# Patient Record
Sex: FEMALE | Race: WHITE | Employment: OTHER | URBAN - METROPOLITAN AREA
[De-identification: names, ages, dates, MRNs, and addresses within clinical notes are randomized per-mention and may not be internally consistent; named-entity substitution may affect disease eponyms.]

---

## 2018-06-27 ENCOUNTER — HOSPITAL ENCOUNTER (OUTPATIENT)
Facility: CLINIC | Age: 83
Setting detail: OBSERVATION
Discharge: NURSING, REHAB OR LONG-TERM CARE | End: 2018-06-29
Attending: EMERGENCY MEDICINE | Admitting: INTERNAL MEDICINE
Payer: MEDICARE

## 2018-06-27 ENCOUNTER — APPOINTMENT (OUTPATIENT)
Dept: GENERAL RADIOLOGY | Facility: CLINIC | Age: 83
End: 2018-06-27
Attending: EMERGENCY MEDICINE
Payer: MEDICARE

## 2018-06-27 DIAGNOSIS — S82.832A CLOSED FRACTURE OF PROXIMAL END OF LEFT FIBULA, UNSPECIFIED FRACTURE MORPHOLOGY, INITIAL ENCOUNTER: ICD-10-CM

## 2018-06-27 DIAGNOSIS — K59.00 CONSTIPATION, UNSPECIFIED CONSTIPATION TYPE: Primary | ICD-10-CM

## 2018-06-27 PROBLEM — S82.402A LEFT FIBULAR FRACTURE: Status: ACTIVE | Noted: 2018-06-27

## 2018-06-27 LAB
ALBUMIN UR-MCNC: 100 MG/DL
ANION GAP SERPL CALCULATED.3IONS-SCNC: 6 MMOL/L (ref 3–14)
APPEARANCE UR: ABNORMAL
BACTERIA #/AREA URNS HPF: ABNORMAL /HPF
BILIRUB UR QL STRIP: NEGATIVE
BUN SERPL-MCNC: 23 MG/DL (ref 7–30)
CALCIUM SERPL-MCNC: 8.4 MG/DL (ref 8.5–10.1)
CHLORIDE SERPL-SCNC: 106 MMOL/L (ref 94–109)
CO2 SERPL-SCNC: 25 MMOL/L (ref 20–32)
COLOR UR AUTO: ABNORMAL
CREAT SERPL-MCNC: 0.93 MG/DL (ref 0.52–1.04)
ERYTHROCYTE [DISTWIDTH] IN BLOOD BY AUTOMATED COUNT: 13.7 % (ref 10–15)
GFR SERPL CREATININE-BSD FRML MDRD: 55 ML/MIN/1.7M2
GLUCOSE SERPL-MCNC: 133 MG/DL (ref 70–99)
GLUCOSE UR STRIP-MCNC: NEGATIVE MG/DL
HCT VFR BLD AUTO: 43.3 % (ref 35–47)
HGB BLD-MCNC: 13.7 G/DL (ref 11.7–15.7)
HGB UR QL STRIP: ABNORMAL
KETONES UR STRIP-MCNC: NEGATIVE MG/DL
LEUKOCYTE ESTERASE UR QL STRIP: ABNORMAL
MCH RBC QN AUTO: 31.4 PG (ref 26.5–33)
MCHC RBC AUTO-ENTMCNC: 31.6 G/DL (ref 31.5–36.5)
MCV RBC AUTO: 99 FL (ref 78–100)
MUCOUS THREADS #/AREA URNS LPF: PRESENT /LPF
NITRATE UR QL: NEGATIVE
PH UR STRIP: 5 PH (ref 5–7)
PLATELET # BLD AUTO: 171 10E9/L (ref 150–450)
POTASSIUM SERPL-SCNC: 4.3 MMOL/L (ref 3.4–5.3)
RBC # BLD AUTO: 4.36 10E12/L (ref 3.8–5.2)
RBC #/AREA URNS AUTO: >182 /HPF (ref 0–2)
SODIUM SERPL-SCNC: 137 MMOL/L (ref 133–144)
SOURCE: ABNORMAL
SP GR UR STRIP: 1.02 (ref 1–1.03)
SQUAMOUS #/AREA URNS AUTO: 1 /HPF (ref 0–1)
UROBILINOGEN UR STRIP-MCNC: 2 MG/DL (ref 0–2)
WBC # BLD AUTO: 9.6 10E9/L (ref 4–11)
WBC #/AREA URNS AUTO: 169 /HPF (ref 0–5)

## 2018-06-27 PROCEDURE — 99285 EMERGENCY DEPT VISIT HI MDM: CPT

## 2018-06-27 PROCEDURE — 81001 URINALYSIS AUTO W/SCOPE: CPT | Performed by: EMERGENCY MEDICINE

## 2018-06-27 PROCEDURE — 36415 COLL VENOUS BLD VENIPUNCTURE: CPT | Performed by: INTERNAL MEDICINE

## 2018-06-27 PROCEDURE — G0378 HOSPITAL OBSERVATION PER HR: HCPCS

## 2018-06-27 PROCEDURE — A9270 NON-COVERED ITEM OR SERVICE: HCPCS | Mod: GY | Performed by: INTERNAL MEDICINE

## 2018-06-27 PROCEDURE — 25000132 ZZH RX MED GY IP 250 OP 250 PS 637: Mod: GY | Performed by: INTERNAL MEDICINE

## 2018-06-27 PROCEDURE — 99219 ZZC INITIAL OBSERVATION CARE,LEVL II: CPT | Performed by: INTERNAL MEDICINE

## 2018-06-27 PROCEDURE — 85027 COMPLETE CBC AUTOMATED: CPT | Performed by: INTERNAL MEDICINE

## 2018-06-27 PROCEDURE — 87086 URINE CULTURE/COLONY COUNT: CPT | Performed by: INTERNAL MEDICINE

## 2018-06-27 PROCEDURE — 80048 BASIC METABOLIC PNL TOTAL CA: CPT | Performed by: INTERNAL MEDICINE

## 2018-06-27 PROCEDURE — 25000132 ZZH RX MED GY IP 250 OP 250 PS 637: Mod: GY | Performed by: EMERGENCY MEDICINE

## 2018-06-27 PROCEDURE — 73562 X-RAY EXAM OF KNEE 3: CPT | Mod: LT

## 2018-06-27 PROCEDURE — A9270 NON-COVERED ITEM OR SERVICE: HCPCS | Mod: GY | Performed by: EMERGENCY MEDICINE

## 2018-06-27 RX ORDER — NALOXONE HYDROCHLORIDE 0.4 MG/ML
.1-.4 INJECTION, SOLUTION INTRAMUSCULAR; INTRAVENOUS; SUBCUTANEOUS
Status: DISCONTINUED | OUTPATIENT
Start: 2018-06-27 | End: 2018-06-29 | Stop reason: HOSPADM

## 2018-06-27 RX ORDER — GINSENG 100 MG
CAPSULE ORAL
Status: DISCONTINUED
Start: 2018-06-27 | End: 2018-06-27 | Stop reason: HOSPADM

## 2018-06-27 RX ORDER — AMOXICILLIN 250 MG
1 CAPSULE ORAL 2 TIMES DAILY PRN
Status: DISCONTINUED | OUTPATIENT
Start: 2018-06-27 | End: 2018-06-29 | Stop reason: HOSPADM

## 2018-06-27 RX ORDER — ASPIRIN 81 MG/1
81 TABLET, CHEWABLE ORAL DAILY
Status: DISCONTINUED | OUTPATIENT
Start: 2018-06-28 | End: 2018-06-29 | Stop reason: HOSPADM

## 2018-06-27 RX ORDER — GABAPENTIN 300 MG/1
300 CAPSULE ORAL AT BEDTIME
Status: DISCONTINUED | OUTPATIENT
Start: 2018-06-27 | End: 2018-06-29 | Stop reason: HOSPADM

## 2018-06-27 RX ORDER — ACETAMINOPHEN 325 MG/1
650 TABLET ORAL EVERY 4 HOURS PRN
Status: DISCONTINUED | OUTPATIENT
Start: 2018-06-27 | End: 2018-06-29 | Stop reason: HOSPADM

## 2018-06-27 RX ORDER — POLYETHYLENE GLYCOL 3350 17 G/17G
17 POWDER, FOR SOLUTION ORAL DAILY PRN
Status: DISCONTINUED | OUTPATIENT
Start: 2018-06-27 | End: 2018-06-29 | Stop reason: HOSPADM

## 2018-06-27 RX ORDER — ONDANSETRON 2 MG/ML
4 INJECTION INTRAMUSCULAR; INTRAVENOUS EVERY 6 HOURS PRN
Status: DISCONTINUED | OUTPATIENT
Start: 2018-06-27 | End: 2018-06-29 | Stop reason: HOSPADM

## 2018-06-27 RX ORDER — BISACODYL 10 MG
10 SUPPOSITORY, RECTAL RECTAL DAILY PRN
Status: DISCONTINUED | OUTPATIENT
Start: 2018-06-27 | End: 2018-06-29 | Stop reason: HOSPADM

## 2018-06-27 RX ORDER — PROCHLORPERAZINE MALEATE 5 MG
5 TABLET ORAL EVERY 6 HOURS PRN
Status: DISCONTINUED | OUTPATIENT
Start: 2018-06-27 | End: 2018-06-29 | Stop reason: HOSPADM

## 2018-06-27 RX ORDER — HYDROCODONE BITARTRATE AND ACETAMINOPHEN 5; 325 MG/1; MG/1
1 TABLET ORAL ONCE
Status: COMPLETED | OUTPATIENT
Start: 2018-06-27 | End: 2018-06-27

## 2018-06-27 RX ORDER — SIMVASTATIN 10 MG
10 TABLET ORAL EVERY EVENING
Status: DISCONTINUED | OUTPATIENT
Start: 2018-06-27 | End: 2018-06-29 | Stop reason: HOSPADM

## 2018-06-27 RX ORDER — AMOXICILLIN 250 MG
2 CAPSULE ORAL 2 TIMES DAILY PRN
Status: DISCONTINUED | OUTPATIENT
Start: 2018-06-27 | End: 2018-06-29 | Stop reason: HOSPADM

## 2018-06-27 RX ORDER — PROCHLORPERAZINE 25 MG
12.5 SUPPOSITORY, RECTAL RECTAL EVERY 12 HOURS PRN
Status: DISCONTINUED | OUTPATIENT
Start: 2018-06-27 | End: 2018-06-29 | Stop reason: HOSPADM

## 2018-06-27 RX ORDER — ACETAMINOPHEN 650 MG/1
650 SUPPOSITORY RECTAL EVERY 4 HOURS PRN
Status: DISCONTINUED | OUTPATIENT
Start: 2018-06-27 | End: 2018-06-29 | Stop reason: HOSPADM

## 2018-06-27 RX ORDER — ONDANSETRON 4 MG/1
4 TABLET, ORALLY DISINTEGRATING ORAL EVERY 6 HOURS PRN
Status: DISCONTINUED | OUTPATIENT
Start: 2018-06-27 | End: 2018-06-29 | Stop reason: HOSPADM

## 2018-06-27 RX ADMIN — SIMVASTATIN 10 MG: 10 TABLET, FILM COATED ORAL at 21:43

## 2018-06-27 RX ADMIN — GABAPENTIN 300 MG: 300 CAPSULE ORAL at 21:43

## 2018-06-27 RX ADMIN — TRAMADOL HYDROCHLORIDE 25 MG: 50 TABLET ORAL at 23:49

## 2018-06-27 RX ADMIN — HYDROCODONE BITARTRATE AND ACETAMINOPHEN 1 TABLET: 5; 325 TABLET ORAL at 15:13

## 2018-06-27 ASSESSMENT — ENCOUNTER SYMPTOMS
COLOR CHANGE: 0
WOUND: 0
WEAKNESS: 0
FEVER: 0
ARTHRALGIAS: 1
MYALGIAS: 0
BACK PAIN: 0
NUMBNESS: 0

## 2018-06-27 NOTE — ED NOTES
[facility]  ED Nurse Handoff Report    Lali Santos is a 89 year old female   ED Chief complaint: Knee Injury (left)  . ED Diagnosis:   Final diagnoses:   Closed fracture of proximal end of left fibula, unspecified fracture morphology, initial encounter     Allergies: No Known Allergies    Code Status: Full Code  Activity level - Baseline/Home:  Independent. Activity Level - Current:   Stand with Assist of 2. Lift room needed: No. Bariatric: No   Needed: No   Isolation: No. Infection: Not Applicable.     Vital Signs:   Vitals:    06/27/18 1508 06/27/18 1530   BP: 124/80 121/79   Pulse: 89    Resp: 15    Temp: 97.7  F (36.5  C)    TempSrc: Temporal    SpO2: 92% 91%       Cardiac Rhythm:  ,      Pain level:    Patient confused: No. Patient Falls Risk: Yes.   Elimination Status: Unable to void, no urge since ED admission  Patient Report - Initial Complaint: pt comes in from [address] building. after slow assist/slide down to floor when left knee gave out when pt was getting out of shower. pt did obtain a skin teat to left lower leg. EMS called pt had no complaints of pain at that time and skin tear was dressed. pt did not hit head, no LOC. EMS on scene at  11:45. staff states pt has chronic knee pain. pt was able to pivot on good leg with assist of EMS. pt normally uses a electric wheelchair.. Focused Assessment: A&Ox4, L knee pain. CMS intact.   Tests Performed: xray. Abnormal Results:   XR Knee Left 3 Views   Final Result   IMPRESSION: Osteopenia about the left knee. There is a minimally   displaced proximal fibular fracture. No other fractures are suspected   in the left knee. Severe osteoarthritis with complete loss of joint   space in the lateral compartment as well as a small knee effusion and   chondrocalcinosis in the medial compartment.      YAMILET KEARNS MD        Treatments provided: Oral pain medication, knee imobilizer. Pt has no IV.  Family Comments: Daughter Ike Santos  updated ([phone]  OBS brochure/video discussed/provided to patient:  Yes  ED Medications:   Medications   HYDROcodone-acetaminophen (NORCO) 5-325 MG per tablet 1 tablet (1 tablet Oral Given 6/27/18 8977)     Drips infusing:  No  For the majority of the shift, the patient's behavior Green. Interventions performed were N/A.     Severe Sepsis OR Septic Shock Diagnosis Present: No      ED Nurse Name/Phone Number: Christofer Umana,   4:54 PM  RECEIVING UNIT ED HANDOFF REVIEW    Above ED Nurse Handoff Report was reviewed: Yes  Reviewed by: Sherlyn Taveras on June 27, 2018 at 5:17 PM

## 2018-06-27 NOTE — IP AVS SNAPSHOT
MRN:[id number]                      After Visit Summary   6/27/2018    Lali Santos    MRN: [id number]           Thank you!     Thank you for choosing [facility] for your care. Our goal is always to provide you with excellent care. Hearing back from our patients is one way we can continue to improve our services. Please take a few minutes to complete the written survey that you may receive in the mail after you visit. If you would like to speak to someone directly about your visit please contact Patient Relations at [phone]. Thank you!          Patient Information     Date Of Birth          10/26/1916        About your hospital stay     You were admitted on:  June 27, 2018 You last received care in the:  [facility] Observation Department    You were discharged on:  June 29, 2018        Reason for your hospital stay       Fall and sustained a left proximal fibular fracture. PT evaluated and recommending TCU. Urinalysis concerning for infection but urine culture is negative.                  Who to Call     For medical emergencies, please call 911.  For non-urgent questions about your medical care, please call your primary care provider or clinic, [phone]          Attending Provider     Provider Specialty    Ankur Bo MD Emergency Medicine    Ron Gardner DO Internal Medicine       Primary Care Provider Office Phone # Fax #    Shiva Garcia [phone]      After Care Instructions     Activity - Up with assistive device           Advance Diet as Tolerated       Follow this diet upon discharge: Regular            Encourage PO fluids           Fall precautions           General info for SNF       Length of Stay Estimate: Short Term Care: Estimated # of Days <30  Condition at Discharge: Stable  Level of care:skilled   Rehabilitation Potential: Good  Admission H&P remains valid and up-to-date: Yes  Recent Chemotherapy:  "N/A  Use Nursing Home Standing Orders: Yes            Mantoux instructions       Give two-step Mantoux (PPD) Per Facility Policy Yes            Weight bearing status       Weight bear as tolerated on left leg, ideally with boot in place. Ok to weight bear as tolerated without boot if patient is not tolerating the boot or if the boot is impairing mobility. If mobility is impaired without the boot, continue use of boot with weightbearing.                  Follow-up Appointments     Follow Up and recommended labs and tests       Follow up with Dr. Andersen of [facility] in 3 weeks. Please call Dr. Andersen's care coordinator, Daphne, at [phone] to schedule this appointment.                  Additional Services     Occupational Therapy Adult Consult       Evaluate and treat as clinically indicated.    Reason:  Left proximal fibular fx            Physical Therapy Adult Consult       Evaluate and treat as clinically indicated.    Reason:  Left proximal fibular fx                             Pending Results     No orders found from 6/25/2018 to 6/28/2018.            Statement of Approval     Ordered          06/29/18 2634  I have reviewed and agree with all the recommendations and orders detailed in this document.  EFFECTIVE NOW     Approved and electronically signed by:  Lali Rousseau PA-C             Admission Information     Date & Time Provider Department Dept. Phone    6/27/2018 Ron Gardner, DO [facility] Observation Department [phone]      Your Vitals Were     Blood Pressure Pulse Temperature Respirations Height Weight    105/87 (BP Location: Right arm) 85 97.7  F (36.5  C) (Oral) 16 1.6 m (5' 3\") 64.1 kg (141 lb 4.8 oz)    Pulse Oximetry BMI (Body Mass Index)                93% 25.03 kg/m2          MyChart Information     [email or web address] lets you send messages to your doctor, view your test results, renew your prescriptions, schedule appointments and more. To sign up, go to " "[email or web address] . Click on \"Log in\" on the left side of the screen, which will take you to the Welcome page. Then click on \"Sign up Now\" on the right side of the page.     You will be asked to enter the access code listed below, as well as some personal information. Please follow the directions to create your username and password.     Your access code is: [email or web address]  Expires: 2018  1:20 PM     Your access code will  in 90 days. If you need help or a new code, please call your [facility] clinic or [phone].        Care EveryWhere ID     This is your Care EveryWhere ID. This could be used by other organizations to access your [facility] medical records  [id number]        Equal Access to Services     DAYNA OSCAR : Alejandra Chapman, rita king, tej heredia, claudio huizar . So [facility] [phone].    ATENCIÓN: Si habla español, tiene a ochoa disposición servicios gratuitos de asistencia lingüística. Ana al [phone].    We comply with applicable federal civil rights laws and [place] laws. We do not discriminate on the basis of race, color, national origin, age, disability, sex, sexual orientation, or gender identity.               Review of your medicines      START taking        Dose / Directions    acetaminophen 500 MG tablet   Commonly known as:  TYLENOL   Used for:  Closed fracture of proximal end of left fibula, unspecified fracture morphology, initial encounter        Dose:  1000 mg   Take 2 tablets (1,000 mg) by mouth 3 times daily   Refills:  0       polyethylene glycol Packet   Commonly known as:  MIRALAX/GLYCOLAX   Used for:  Constipation, unspecified constipation type        Dose:  17 g   Take 17 g by mouth daily as needed for constipation   Quantity:  7 packet   Refills:  0       traMADol 50 MG tablet   Commonly known as:  ULTRAM   Used for:  Closed fracture of proximal end of left fibula, unspecified fracture morphology, " initial encounter        Dose:  25 mg   Take 0.5 tablets (25 mg) by mouth every 6 hours as needed for moderate pain   Quantity:  10 tablet   Refills:  0         CONTINUE these medicines which have NOT CHANGED        Dose / Directions    aspirin 81 MG chewable tablet        Dose:  81 mg   Take 81 mg by mouth daily   Refills:  0       cholecalciferol 2000 units Caps        Dose:  1 capsule   Take 1 capsule by mouth daily   Refills:  0       metoprolol tartrate 25 MG tablet   Commonly known as:  LOPRESSOR        Dose:  12.5 mg   Take 12.5 mg by mouth every morning   Refills:  0       multivitamin Tabs tablet        Dose:  1 tablet   Take 1 tablet by mouth daily   Refills:  0       simvastatin 10 MG tablet   Commonly known as:  ZOCOR        Dose:  10 mg   Take 10 mg by mouth every evening   Refills:  0            Where to get your medicines      Some of these will need a paper prescription and others can be bought over the counter. Ask your nurse if you have questions.     Bring a paper prescription for each of these medications     traMADol 50 MG tablet       You don't need a prescription for these medications     acetaminophen 500 MG tablet    polyethylene glycol Packet                Protect others around you: Learn how to safely use, store and throw away your medicines at www.disposemymeds.org.        Information about OPIOIDS     PRESCRIPTION OPIOIDS: WHAT YOU NEED TO KNOW   We gave you an opioid (narcotic) pain medicine. It is important to manage your pain, but opioids are not always the best choice. You should first try all the other options your care team gave you. Take this medicine for as short a time (and as few doses) as possible.     These medicines have risks:    DO NOT drive when on new or higher doses of pain medicine. These medicines can affect your alertness and reaction times, and you could be arrested for driving under the influence (DUI). If you need to use opioids long-term, talk to your care team  about driving.    DO NOT operate heave machinery    DO NOT do any other dangerous activities while taking these medicines.     DO NOT drink any alcohol while taking these medicines.      If the opioid prescribed includes acetaminophen, DO NOT take with any other medicines that contain acetaminophen. Read all labels carefully. Look for the word  acetaminophen  or  Tylenol.  Ask your pharmacist if you have questions or are unsure.    You can get addicted to pain medicines, especially if you have a history of addiction (chemical, alcohol or substance dependence). Talk to your care team about ways to reduce this risk.    Store your pills in a secure place, locked if possible. We will not replace any lost or stolen medicine. If you don t finish your medicine, please throw away (dispose) as directed by your pharmacist. The [place] Pollution Control Agency has more information about safe disposal: https://www.pca.[facility].[place].us/living-green/managing-unwanted-medications.     All opioids tend to cause constipation. Drink plenty of water and eat foods that have a lot of fiber, such as fruits, vegetables, prune juice, apple juice and high-fiber cereal. Take a laxative (Miralax, milk of magnesia, Colace, Senna) if you don t move your bowels at least every other day.              Medication List: This is a list of all your medications and when to take them. Check marks below indicate your daily home schedule. Keep this list as a reference.      Medications           Morning Afternoon Evening Bedtime As Needed    acetaminophen 500 MG tablet   Commonly known as:  TYLENOL   Take 2 tablets (1,000 mg) by mouth 3 times daily   Last time this was given:  975 mg on 6/29/2018 12:15 PM                                aspirin 81 MG chewable tablet   Take 81 mg by mouth daily   Last time this was given:  81 mg on 6/29/2018  9:46 AM                                cholecalciferol 2000 units Caps   Take 1 capsule by mouth daily                                 metoprolol tartrate 25 MG tablet   Commonly known as:  LOPRESSOR   Take 12.5 mg by mouth every morning   Last time this was given:  12.5 mg on 6/29/2018  9:49 AM                                multivitamin Tabs tablet   Take 1 tablet by mouth daily                                polyethylene glycol Packet   Commonly known as:  MIRALAX/GLYCOLAX   Take 17 g by mouth daily as needed for constipation                                simvastatin 10 MG tablet   Commonly known as:  ZOCOR   Take 10 mg by mouth every evening   Last time this was given:  10 mg on 6/28/2018  8:07 PM                                traMADol 50 MG tablet   Commonly known as:  ULTRAM   Take 0.5 tablets (25 mg) by mouth every 6 hours as needed for moderate pain   Last time this was given:  25 mg on 6/29/2018  4:46 PM

## 2018-06-27 NOTE — ED TRIAGE NOTES
pt comes in from [address] building. after slow assist/slide down to floor when left knee gave out when pt was getting out of shower. pt did obtain a skin teat to left lower leg. EMS called pt had no complaints of pain at that time and skin tear was dressed. pt did not hit head, no LOC. EMS on scene at  11:45. staff states pt has chronic knee pain. pt was able to pivot on good leg with assist of EMS. pt normally uses a electric wheelchair.

## 2018-06-27 NOTE — PLAN OF CARE
Problem: Patient Care Overview  Goal: Plan of Care/Patient Progress Review  Outcome: No Change  Acute Traumatic Pain     1.  Pain controlled with oral analgesia: Yes      2.  Vital signs stable: Yes      3.  Diagnotic testing complete: Yes, xray showed fib fracture       4.  Cleared from consultants (if applicable): N/A      5. Return to near baseline physical activity: no

## 2018-06-27 NOTE — PHARMACY-ADMISSION MEDICATION HISTORY
Admission medication history interview status for this patient is complete. See [facility] admission navigator for allergy information, prior to admission medications and immunization status.     Medication history interview source(s):Patient and Family  Medication history resources (including written lists, pill bottles, clinic record):None  Primary pharmacy:[facility] in [place]    Changes made to PTA medication list:  Added: -  Deleted: amlodipine ( per daughter)  Changed: metoprolol tart to 12.5 mg qd    Actions taken by pharmacist (provider contacted, etc):Called daughter Sue to clarify meds     Additional medication history information:None    Medication reconciliation/reorder completed by provider prior to medication history? No    Do you take OTC medications (eg tylenol, ibuprofen, fish oil, eye/ear drops, etc)? yes(Y/N)    For patients on insulin therapy: yes (Y/N)  Lantus/levemir/NPH/Mix 70/30 dose:   (Y/N) (see Med list for doses)   Sliding scale Novolog Y/N  If Yes, do you have a baseline novolog pre-meal dose:  units with meals  Patients eat three meals a day:   Y/N    How many episodes of hypoglycemia do you have per week: _______  How many missed doses do you have per week: ______  How many times do you check your blood glucose per day: _______   Any Barriers to therapy - Be specific :  cost of medications, comfortable with giving injections (if applicable), comfortable and confident with current diabetes regimen: Y/N ______________      Prior to Admission medications    Medication Sig Last Dose Taking? Auth Provider   aspirin 81 MG chewable tablet Take 81 mg by mouth daily 6/27/2018 at Unknown time Yes Unknown, Entered By History   cholecalciferol 2000 UNITS CAPS Take 1 capsule by mouth daily 6/27/2018 at Unknown time Yes Unknown, Entered By History   metoprolol (LOPRESSOR) 25 MG tablet Take 12.5 mg by mouth every morning  6/27/2018 at Unknown time Yes Unknown, Entered By History   multivitamin (OCUVITE)  TABS Take 1 tablet by mouth daily 6/27/2018 at Unknown time Yes Unknown, Entered By History   simvastatin (ZOCOR) 10 MG tablet Take 10 mg by mouth every evening 6/26/2018 at Unknown time Yes Unknown, Entered By History

## 2018-06-27 NOTE — IP AVS SNAPSHOT
[facility] Observation Department    [address]    [facility] [address]    Phone:  [phone]                                       After Visit Summary   6/27/2018    Lali Santos    MRN: [id number]           After Visit Summary Signature Page     I have received my discharge instructions, and my questions have been answered. I have discussed any challenges I see with this plan with the nurse or doctor.    ..........................................................................................................................................  Patient/Patient Representative Signature      ..........................................................................................................................................  Patient Representative Print Name and Relationship to Patient    ..................................................               ................................................  Date                                            Time    ..........................................................................................................................................  Reviewed by Signature/Title    ...................................................              ..............................................  Date                                                            Time

## 2018-06-27 NOTE — ED PROVIDER NOTES
History     Chief Complaint:  Leg Pain    HPI   Lali Santos is a 89 year old female who presents with leg pain. The patient suffered a minor fall while in the shower with a helper at approximately 1145 this morning; the patient was slowly lowered/slid down to the floor and suffered a skin tear to the left leg. EMS was called soon thereafter for this and placed a dressing at the area but felt that patient did not require transport at that time. Some time later, the staff at her assisted living facility called EMS once more as patient began to have complaints of increased left leg pain. On arrival patient has no complaints other than left knee and leg pain. No other symptoms, injuries, or concerns reported. Patient was not given any medications prior to arrival.    Allergies:  No known drug allergies.    Medications:    Norvasc  Aspirin  Cholecalciferol  Metoprolol  Multivitamin  Zocor     Past Medical History:    Coronary atherosclerosis of unspecified type of vessel, native or graft  Other and unspecified hyperlipidemia  Osteoarthrosis, unspecified whether generalized or localized, pelvic region and thigh  Unspecified essential hypertension  Unspecified disorder of thyroid  Lumbago  DJD (degenerative joint disease) of knee  Acute Appendicitis    Past Surgical History:    Coronary artery bypass graft  CATARACT REMOVAL  CVL CORONARY ANGIOGRAM  CORONARY ARTERY BYPASS GRAFT  INGROWN TOENAIL REMOVAL  LAP CHOLECYSTECTOMY  APPENDECTOMY  HYSTERECTOMY    Family History:    Stroke- Brother  Heart Disease-Brother  Heart Disease-Father  Stroke-Mother  Cancer-Sister  Stroke-Sister  Cancer-Sister  Arthritis-Son    Social History:  Smoking status: Never smoker  Alcohol use: No  Marital Status:        Review of Systems   Constitutional: Negative for fever.   Musculoskeletal: Positive for arthralgias and gait problem. Negative for back pain and myalgias.   Skin: Negative for color change and wound.   Neurological:  Negative for weakness and numbness.   All other systems reviewed and are negative.    Physical Exam     Patient Vitals for the past 24 hrs:   BP Temp Temp src Pulse Heart Rate Resp SpO2   06/27/18 1508 124/80 97.7  F (36.5  C) Temporal 89 89 15 92 %         Physical Exam  General: Patient is alert and cooperative.  HENT:  No trauma.  Eyes: EOMI. Normal conjunctiva.  Neck:  Normal range of motion and appearance. No tenderness.   Cardiovascular:  Normal rate, regular rhythm and normal heart sounds.   Pulmonary/Chest:  Effort normal. No wheezing or crackles.  Abdominal: Soft. No distension or tenderness.     Musculoskeletal: LLE notable for tenderness and swelling of knee with limited mobility due to pain.  Superficial skin tear anterior shin, no active bleeding.    Neurological: oriented, no focal weakness. Chronic drop foot left side.   Skin:  No lacerations.  Psychiatric: Normal mood and affect. Normal behavior and judgement.      Emergency Department Course   Imaging:  XR Knee, Left, 3 views:  IMPRESSION: Osteopenia about the left knee. There is a minimally displaced proximal fibular fracture. No other fractures are suspected in the left knee. Severe osteoarthritis with complete loss of joint  space in the lateral compartment as well as a small knee effusion and chondrocalcinosis in the medial compartment.  Report per radiology.     Interventions:  (1513) Hydrocodone-Acetaminophen 5-325 mg, PO    Emergency Department Course:  Nursing notes and vitals reviewed.  (8479) I performed an exam of the patient as documented above.    The patient was sent for a x-ray while in the emergency department, findings above. \    Findings and plan explained to the patient who consents to admission.   (9507) I discussed the patient with Dr. Gardner of the hospitalist service, who will admit the patient to a OBS bed for further monitoring, evaluation, and treatment.    Impression & Plan    Medical Decision Making:  He is  89-year-old female arrives by ambulance for evaluation of the left knee and lower leg injury sustained secondary to fall in shower.  She was accompanied by her PCA at the time.  She reports that her knee gave out and she fell in the process of extricating her from the shower it sounds like her knee was bent causing worsening pain.  She did not strike her head or lose consciousness.  There was no syncopal incident.  Physical examination reveals marked swelling and tenderness of the left knee as well as superficial skin tears to the lower x-ray discloses a nondisplaced proximal fibula fracture.  She was placed in a knee immobilizer required admission for supportive cares and eventual placement into a transitional care or nursing home type facility.  She lives in a senior apartment presently.    Diagnosis:    ICD-10-CM   1. Closed fracture of proximal end of left fibula, unspecified fracture morphology, initial encounter S82.832A       Disposition:  Patient is admitted to a OBS bed under the care of Dr. Gardner.           I, Richard Veloz, am serving as a scribe on 6/27/2018 at 2:59 PM to personally document services performed by Dr. Bo based on my observations and the provider's statements to me.         Richard Veloz  6/27/2018   [facility] EMERGENCY DEPARTMENT       Ankur Bo MD  06/28/18 0859

## 2018-06-27 NOTE — IP AVS SNAPSHOT
Lali Santos #[id number] (CSN: [id number])  (89 year old F)  (Adm: 18)     [id number]               [facility] OBSERVATION DEPARTMENT: [phone]            Patient Demographics     Patient Name Sex          Age SSN Address Phone    Tom Lali MIKE Female 10/26/1916 (89 year old) [id number]    [facility] [address] [phone] (Home) *Preferred*  [phone] (Mobile)      Emergency Contact(s)     Name Relation Home Work Mobile    Owen Santos Son [phone]      Sue Santos Daughter [phone]        Admission Information     Attending Provider Admitting Provider Admission Type Admission Date/Time    Ron Gardner, DO Ron Gardner, DO Emergency 18  1452    Discharge Date Hospital Service Auth/Cert Status Service Area     Observation Incomplete [facility]    Unit Room/Bed Admission Status        OBSERVATION DEPT  Admission (Confirmed)       Admission     Complaint    Left fibular fracture      Hospital Account     Name Acct ID Class Status Primary Coverage    Lali Santos [id number] Observation Open MEDICARE - MEDICARE            Guarantor Account (for Hospital Account #[id number])     Name Relation to Pt Service Area Active? Acct Type    Lali Santos Self FCS Yes Personal/Family    Address Phone          [address] [place] [address] [phone](H)              Coverage Information (for Hospital Account #[id number])     1. MEDICARE/MEDICARE     F/O Payor/Plan Precert #    MEDICARE/MEDICARE     Subscriber Subscriber #    Lali Santos [id number]    Address Phone    ATTN CLAIMS  [address]  [facility] [place] [address] [phone]          2. BCBS/[employer]BS [employer] EM[employer]OYEE PROGRAM     F/O Payor/Plan Precert #    BCBS/BCBS [employer] EMPLOYEE PROGRAM     Subscriber Subscriber #    Lali Santos [id number]    Address Phone    [id number]  [place] [address] [phone]                                                      INTERAGENCY TRANSFER FORM - PHYSICIAN ORDERS   6/27/2018                       [facility] OBSERVATION DEPARTMENT: [phone]            Attending Provider: Ron Gardner DO     Allergies:  No Known Allergies    Infection:  None   Service:  Observation    Ht:  1.6 m (5' 3\")   Wt:  64.1 kg (141 lb 4.8 oz)   Admission Wt:  64.1 kg (141 lb 4.8 oz)    BMI:  25.03 kg/m 2   BSA:  1.69 m 2            ED Clinical Impression     Diagnosis Description Comment Added By Time Added    Closed fracture of proximal end of left fibula, unspecified fracture morphology, initial encounter [S82.832A] Closed fracture of proximal end of left fibula, unspecified fracture morphology, initial encounter [S82.832A]  Ankur Bo MD 6/27/2018  4:12 PM      Hospital Problems as of 6/29/2018              Priority Class Noted POA    Left fibular fracture Medium  6/27/2018 Yes      Non-Hospital Problems as of 6/29/2018              Priority Class Noted    Atrial fibrillation with rapid ventricular response (H) Medium  10/11/2015      Code Status History     Date Active Date Inactive Code Status Order ID Comments User Context    6/29/2018 12:54 PM  DNR/DNI [id number]  Lali Rousseau PA-C Outpatient    6/28/2018 12:44 PM 6/29/2018 12:54 PM DNR/DNI [id number]  Imelda Hunter PA-C Inpatient    10/11/2015 11:31 PM 10/13/2015  3:24 PM DNR/DNI [id number]  Rao Cosme MD Inpatient      Current Code Status     Date Active Code Status Order ID Comments User Context       Prior      Summary of Visit     Reason for your hospital stay       Fall and sustained a left proximal fibular fracture. PT evaluated and recommending TCU. Urinalysis concerning for infection but urine culture is negative.                Medication Review      START taking        Dose / Directions Comments    acetaminophen 500 MG tablet   Commonly known as:  TYLENOL "   Used for:  Closed fracture of proximal end of left fibula, unspecified fracture morphology, initial encounter        Dose:  1000 mg   Take 2 tablets (1,000 mg) by mouth 3 times daily   Refills:  0        polyethylene glycol Packet   Commonly known as:  MIRALAX/GLYCOLAX   Used for:  Constipation, unspecified constipation type        Dose:  17 g   Take 17 g by mouth daily as needed for constipation   Quantity:  7 packet   Refills:  0        traMADol 50 MG tablet   Commonly known as:  ULTRAM   Used for:  Closed fracture of proximal end of left fibula, unspecified fracture morphology, initial encounter        Dose:  25 mg   Take 0.5 tablets (25 mg) by mouth every 6 hours as needed for moderate pain   Quantity:  10 tablet   Refills:  0          CONTINUE these medications which have NOT CHANGED        Dose / Directions Comments    aspirin 81 MG chewable tablet        Dose:  81 mg   Take 81 mg by mouth daily   Refills:  0        cholecalciferol 2000 units Caps        Dose:  1 capsule   Take 1 capsule by mouth daily   Refills:  0        metoprolol tartrate 25 MG tablet   Commonly known as:  LOPRESSOR        Dose:  12.5 mg   Take 12.5 mg by mouth every morning   Refills:  0        multivitamin Tabs tablet        Dose:  1 tablet   Take 1 tablet by mouth daily   Refills:  0        simvastatin 10 MG tablet   Commonly known as:  ZOCOR        Dose:  10 mg   Take 10 mg by mouth every evening   Refills:  0                After Care     Activity - Up with assistive device           Advance Diet as Tolerated       Follow this diet upon discharge: Regular       Encourage PO fluids           Fall precautions           General info for SNF       Length of Stay Estimate: Short Term Care: Estimated # of Days <30  Condition at Discharge: Stable  Level of care:skilled   Rehabilitation Potential: Good  Admission H&P remains valid and up-to-date: Yes  Recent Chemotherapy: N/A  Use Nursing Home Standing Orders: Yes       Mantoux instructions  "      Give two-step Mantoux (PPD) Per Facility Policy Yes       Weight bearing status       Weight bear as tolerated on left leg, ideally with boot in place. Ok to weight bear as tolerated without boot if patient is not tolerating the boot or if the boot is impairing mobility. If mobility is impaired without the boot, continue use of boot with weightbearing.             Referrals     Occupational Therapy Adult Consult       Evaluate and treat as clinically indicated.    Reason:  Left proximal fibular fx       Physical Therapy Adult Consult       Evaluate and treat as clinically indicated.    Reason:  Left proximal fibular fx             Follow-Up Appointment Instructions     Follow Up and recommended labs and tests       Follow up with Dr. Andersen of [facility] in 3 weeks. Please call Dr. Andersen's care coordinator, Daphne, at [phone] to schedule this appointment.             Statement of Approval     Ordered          06/29/18 0568  I have reviewed and agree with all the recommendations and orders detailed in this document.  EFFECTIVE NOW     Approved and electronically signed by:  Lali Rousseau PA-C                                                 INTERAGENCY TRANSFER FORM - NURSING   6/27/2018                       [facility] OBSERVATION DEPARTMENT: [phone]            Attending Provider: Ron Gardner DO     Allergies:  No Known Allergies    Infection:  None   Service:  Observation    Ht:  1.6 m (5' 3\")   Wt:  64.1 kg (141 lb 4.8 oz)   Admission Wt:  64.1 kg (141 lb 4.8 oz)    BMI:  25.03 kg/m 2   BSA:  1.69 m 2            Advance Directives        Scanned docmt in ACP Activity?           No scanned doc        Immunizations     None      ASSESSMENT     Discharge Profile Flowsheet     EXPECTED DISCHARGE     Patient's communication style  spoken language (English or Bilingual) 06/27/18 5196    Expected Discharge Date  -- ([facility]?) 06/28/18 1402   FINAL RESOURCES   "    DISCHARGE NEEDS ASSESSMENT     Resources List  Skilled Nursing Facility 06/28/18 1654    Patient/family verbalizes understanding of discharge plan recommendations?  Yes (daughter antic. TCU, SW will discuss with pt after PT assess) 06/28/18 1654   Skilled Nursing Facility  [facility] [phone], Fax: [phone] 06/29/18 1250    Readmission Within The Last 30 Days  no previous admission in last 30 days 06/28/18 1654   SKIN      Equipment Currently Used at Home  wheelchair, power 06/29/18 1104   Inspection of bony prominences  Full 06/29/18 1202    Transportation Available  [facility] @ 1745) 06/29/18 1250   Focused inspection of bony prominences  Knee, left 06/29/18 0042    Current Discharge Risk  -- (to be determined) 06/28/18 1654   Full except areas not inspected   Coccyx;Sacrum;Buttock, right;Buttock, left 06/28/18 1428    # of Referrals Placed by CTS  Transportation;Post Acute Facilities 06/29/18 1250   Inspection under devices  Full 06/28/18 1605    New Steerage to  Clinics?  No 06/28/18 1652   Focused inspection under devices  Knee immobilizer 06/27/18 2359    Primary Care MD Name  Jose 06/28/18 1652   Skin WDL  ex 06/29/18 1202    ASSESSMENT OF FUNCTIONAL STATUS     Skin Temperature  warm 06/29/18 1202    Prior to admission patient needed assistance with:  Transportation;Shopping;Handling finances;Laundry/Housekeeping 06/28/18 1652   Skin Moisture  dry 06/29/18 1202    Assesssment of Functional Status  Not at baseline with ADL Functioning;Not at baseline with mobility (placement consideration) 06/28/18 1652   Skin Elasticity  slow return to original state 06/29/18 1202    GASTROINTESTINAL (ADULT,PEDIATRIC,OB)     Skin Integrity  bruise(s);abrasion(s);skin tear(s);scab(s) 06/29/18 1202    GI WDL  WDL 06/29/18 1202   SAFETY      Last Bowel Movement  -- (PTA, does not recall when ) 06/29/18 1202   Safety WDL  WDL 06/29/18 1202    Passing flatus  " yes 06/29/18 1202   All Alarms  alarm(s) activated and audible 06/29/18 1202    COMMUNICATION ASSESSMENT                        Assessment WDL (Within Defined Limits) Definitions           Safety WDL     Effective: 09/28/15    Row Information: <b>WDL Definition:</b> Bed in low position, wheels locked; call light in reach; upper side rails up x 2; ID band on<br> <font color=\"gray\"><i>Item=AS safety wdl>>List=AS safety wdl>>Version=F14</i></font>      Skin WDL     Effective: 09/28/15    Row Information: <b>WDL Definition:</b> Warm; dry; intact; elastic; without discoloration; pressure points without redness<br> <font color=\"gray\"><i>Item=AS skin wdl>>List=AS skin wdl>>Version=F14</i></font>      Vitals     Vital Signs Flowsheet     VITAL SIGNS     Pain Orientation  Left 06/28/18 1256    Temp  98.1  F (36.7  C) 06/29/18 1223   Pain Descriptors  Shooting 06/28/18 1256    Temp src  Oral 06/29/18 1223   Pain Intervention(s)  Medication (See eMAR) 06/28/18 1256    Resp  14 06/29/18 1223   Response to Interventions  Decrease in pain 06/28/18 1256    Pulse  85 06/28/18 2003   HEIGHT AND WEIGHT      Heart Rate  64 06/29/18 1223   Height  1.6 m (5' 3\") (per pt report) 06/28/18 0922    Pulse/Heart Rate Source  Monitor 06/29/18 1223   Weight  64.1 kg (141 lb 4.8 oz) 06/28/18 0922    BP  122/57 06/29/18 1223   Weight Method  Bed scale 06/28/18 0922    BP Location  Right arm 06/29/18 1223   BSA (Calculated - sq m)  1.69 06/28/18 0922    OXYGEN THERAPY     BMI (Calculated)  25.08 06/28/18 0922    SpO2  96 % 06/29/18 1223   DAILY CARE      O2 Device  None (Room air) 06/29/18 1223   Activity Management  bedrest 06/29/18 1046    PAIN/COMFORT     Activity Assistance Provided  assistance, 2 people 06/29/18 1046    Patient Currently in Pain  denies 06/29/18 0341   POSITIONING      Preferred Pain Scale  number (Numeric Rating Pain Scale) 06/28/18 1256   Body Position  turned;side-lying, right 06/29/18 1145    Patient's Stated Pain Goal  " No pain 06/27/18 2354   Head of Bed (HOB)  HOB at 30-45 degrees 06/29/18 0837    0-10 Pain Scale  0 06/29/18 1216   Positioning/Transfer Devices  pillows;in use 06/29/18 0416    Pain Location  Knee 06/28/18 1256   Chair  Upright in chair 06/29/18 1223            Patient Lines/Drains/Airways Status    Active LINES/DRAINS/AIRWAYS     Name: Placement date: Placement time: Site: Days: Last dressing change:    Peripheral IV 10/11/15 Right 10/11/15   2222      991     Peripheral IV 06/28/18 Right Upper forearm 06/28/18   1030   Upper forearm   1             Patient Lines/Drains/Airways Status    Active PICC/CVC     None            Intake/Output Detail Report     Date Intake   Output    Shift P.O. IV Piggyback Total Total       Noc 06/27/18 2300 - 06/28/18 0659 -- -- -- -- 0    Day 06/28/18 0700 - 06/28/18 1459 -- -- -- -- 0    Hina 06/28/18 1500 - 06/28/18 2259 150 -- 150 -- 150    Noc 06/28/18 2300 - 06/29/18 0659 -- -- -- -- 0    Day 06/29/18 0700 - 06/29/18 1459 -- -- -- -- 0      Last Void/BM       Most Recent Value    Urine Occurrence 1 at 06/29/2018 0700    Stool Occurrence 1 at 06/29/2018 1046      Case Management/Discharge Planning     Case Management/Discharge Planning Flowsheet     REFERRAL INFORMATION     Do you work full or part-time?  no 06/28/18 1654    Did the Initial Social Work Assessment result in a Social Work Case?  Yes 06/28/18 1644   COPING/STRESS      Admission Type  observation 06/28/18 1644   Major Change/Loss/Stressor  none 10/12/15 0145    Arrived From  home or self-care 06/28/18 1652   EXPECTED DISCHARGE      Referral Source  physician 06/28/18 1652   Expected Discharge Date  -- ([facility]?) 06/28/18 1402    New Steerage to  Clinics?  No 06/28/18 1652   DISCHARGE PLANNING      # of Referrals Placed by CTS  Transportation;Post Acute Facilities 06/29/18 1250   Patient/family verbalizes understanding of discharge plan recommendations?  Yes (daughter antic. TCU, SW will discuss with pt after  PT assess) 06/28/18 1654    Reason For Consult  discharge planning 06/28/18 1644   Readmission Within The Last 30 Days  no previous admission in last 30 days 06/28/18 1654    Record Reviewed  clinical discipline documentation;plan of care;patient profile 06/28/18 1644   Transportation Available  van, wheelchair accessible ([facility], 6/29 @ 1745) 06/29/18 1250    CTS Assigned to Case  Carole Conn 06/28/18 1644   Current Discharge Risk  -- (to be determined) 06/28/18 1654    Primary Care MD Name  Jose 06/28/18 1652   PATIENT PLACEMENT INFORMATION      LIVING ENVIRONMENT     Did the patient choose [facility]?  Yes 06/28/18 1654    Lives With  alone 06/29/18 1104   Placement Choice Reason  location 06/28/18 1654    Living Arrangements  apartment ([facility]) 06/29/18 1104   Did [facility] accept the patient?  Yes 06/29/18 1250    Provides Primary Care For  no one 06/28/18 1652   FINAL RESOURCES      Quality Of Family Relationships  supportive;involved 06/28/18 1652   Equipment Currently Used at Home  wheelchair, power 06/29/18 1104    Able to Return to Prior Living Arrangements  -- (anticipate need for rehab stay before return home) 06/28/18 1652   Resources List  Skilled Nursing Facility 06/28/18 1654    ASSESSMENT OF FAMILY/SOCIAL SUPPORT     Skilled Nursing Facility  [facility] [phone], Fax: [phone] 06/29/18 1250    Marital Status   06/28/18 1652   ABUSE RISK SCREEN      Who is your support system?  Children 06/28/18 1652   QUESTION TO PATIENT:  Has a member of your family or a partner(now or in the past) intimidated, hurt, manipulated, or controlled you in any way?  no 06/27/18 1508    Description of Support System  Supportive;Involved 06/28/18 1652   QUESTION TO PATIENT: Do you feel safe going back to the place where you are living?  yes 06/27/18 1508    Support Assessment  Adequate family and caregiver support 06/28/18 1652   OTHER      ASSESSMENT OF FUNCTIONAL STATUS      Are you depressed or being treated for depression?  No 06/27/18 1844    Prior to admission patient needed assistance with:  Transportation;Shopping;Handling finances;Laundry/Housekeeping 06/28/18 1652   HOMICIDE RISK      Assesssment of Functional Status  Not at baseline with ADL Functioning;Not at baseline with mobility (placement consideration) 06/28/18 1652   Feels Like Hurting Others  no 06/27/18 1508    EMPLOYMENT                         [facility] OBSERVATION DEPARTMENT: [phone]            Medication Administration Report for Lali Santos as of 06/29/18 1320   Legend:    Given Hold Not Given Due Canceled Entry Other Actions    Time Time (Time) Time  Time-Action       Inactive    Active    Linked        Medications 06/23/18 06/24/18 06/25/18 06/26/18 06/27/18 06/28/18 06/29/18    acetaminophen (TYLENOL) Suppository 650 mg  Dose: 650 mg  Freq: EVERY 4 HOURS PRN Route: RE  PRN Reason: mild pain  Start: 06/27/18 1956   Admin Instructions: Alternate ibuprofen (if ordered) with acetaminophen.  Maximum acetaminophen dose from all sources = 75 mg/kg/day not to exceed 4 grams/day.    Admin. Amount: 1 suppository (1 × 650 mg suppository)  Dispense Loc:  Main Pharmacy               acetaminophen (TYLENOL) tablet 650 mg  Dose: 650 mg  Freq: EVERY 4 HOURS PRN Route: PO  PRN Reason: mild pain  Start: 06/27/18 1956   Admin Instructions: Alternate ibuprofen (if ordered) with acetaminophen.  Maximum acetaminophen dose from all sources = 75 mg/kg/day not to exceed 4 grams/day.    Admin. Amount: 2 tablet (2 × 325 mg tablet)  Dispense Loc:  [facility] OBS               acetaminophen (TYLENOL) tablet 975 mg  Dose: 975 mg  Freq: EVERY 8 HOURS Route: PO  Start: 06/28/18 1141   Admin Instructions: Maximum acetaminophen dose from all sources = 75 mg/kg/day not to exceed 4 grams/day.    Admin. Amount: 3 tablet (3 × 325 mg tablet)  Last Admin: 06/29/18 1215  Dispense Loc:  [facility] OBS          1211 (134  mg)-Given       2006 (975 mg)-Given        0333 (975 mg)-Given       1215 (975 mg)-Given       [ ] 1941           aspirin chewable tablet 81 mg  Dose: 81 mg  Freq: DAILY Route: PO  Start: 06/28/18 0800   Admin. Amount: 1 tablet (1 × 81 mg tablet)  Last Admin: 06/29/18 0946  Dispense Loc: [facility] OBS          0817 (81 mg)-Given        0946 (81 mg)-Given           bisacodyl (DULCOLAX) Suppository 10 mg  Dose: 10 mg  Freq: DAILY PRN Route: RE  PRN Reason: constipation  Start: 06/27/18 1958   Admin Instructions: Hold for loose stools.  This is the third step of a three step constipation treatment.    Admin. Amount: 1 suppository (1 × 10 mg suppository)  Dispense Loc: [facility] OBS               cefTRIAXone (ROCEPHIN) 1 g vial to attach to  mL bag for ADULTS or NS 50 mL bag for PEDS  Dose: 1 g  Freq: EVERY 24 HOURS Route: IV  Indications of Use: URINARY TRACT INFECTION  Last Dose: 1 g (06/28/18 1039)  Start: 06/28/18 0801   Admin. Amount: 1 g  Last Admin: 06/29/18 0947  Dispense Loc: [facility] OBS  Infused Over: 15-30 Minutes  Volume: 10 mL   Current Line: Peripheral IV 10/11/15 Right         1039 (1 g)-New Bag        0947 (1 g)-New Bag           cholecalciferol (vitamin D3) tablet 2,000 Units  Dose: 2,000 Units  Freq: DAILY Route: PO  Start: 06/28/18 0800   Admin. Amount: 2 tablet (2 × 1,000 Units tablet)  Last Admin: 06/29/18 0947  Dispense Loc: [facility] OBS          0817 (2,000 Units)-Given        0947 (2,000 Units)-Given           gabapentin (NEURONTIN) capsule 300 mg  Dose: 300 mg  Freq: AT BEDTIME Route: PO  Start: 06/27/18 2200   Admin. Amount: 1 capsule (1 × 300 mg capsule)  Last Admin: 06/28/18 2007  Dispense Loc: [facility] OBS         2143 (300 mg)-Given        2007 (300 mg)-Given               [ ] 2200           melatonin tablet 1 mg  Dose: 1 mg  Freq: AT BEDTIME PRN Route: PO  PRN Reason: sleep  Start: 06/27/18 1956   Admin Instructions: Do not give unless at least 6 hours of uninterrupted  sleep is expected.    Admin. Amount: 1 tablet (1 × 1 mg tablet)  Dispense Loc: [facility] OBS               metoprolol tartrate (LOPRESSOR) half-tab 12.5 mg  Dose: 12.5 mg  Freq: EVERY MORNING Route: PO  Start: 06/28/18 0800   Admin. Amount: 1 half-tab (1 × 12.5 mg half-tab)  Last Admin: 06/29/18 0949  Dispense Loc: [facility] OBS          0817 (12.5 mg)-Given        0949 (12.5 mg)-Given           naloxone (NARCAN) injection 0.1-0.4 mg  Dose: 0.1-0.4 mg  Freq: EVERY 2 MIN PRN Route: IV  PRN Reason: opioid reversal  Start: 06/27/18 1956   Admin Instructions: For respiratory rate LESS than or EQUAL to 8.  Partial reversal dose:  0.1 mg titrated q 2 minutes for Analgesia Side Effects Monitoring Sedation Level of 3 (frequently drowsy, arousable, drifts to sleep during conversation).Full reversal dose:  0.4 mg bolus for Analgesia Side Effects Monitoring Sedation Level of 4 (somnolent, minimal or no response to stimulation).  For ordered doses up to 2mg give IVP. Give each 0.4mg over 15 seconds in emergency situations. For non-emergent situations further dilute in 9mL of NS to facilitate titration of response.    Admin. Amount: 0.1-0.4 mg = 0.25-1 mL Conc: 0.4 mg/mL  Dispense Loc: [facility] AD[facility] OBS  Volume: 1 mL               ondansetron (ZOFRAN-ODT) ODT tab 4 mg  Dose: 4 mg  Freq: EVERY 6 HOURS PRN Route: PO  PRN Reasons: nausea,vomiting  Start: 06/27/18 1956   Admin Instructions: This is Step 1 of nausea and vomiting management.  If nausea not resolved in 15 minutes, go to Step 2 prochlorperazine (COMPAZINE). Do not push through foil backing. Peel back foil and gently remove. Place on tongue immediately. Administration with liquid unnecessary  With dry hands, peel back foil backing and gently remove tablet; do not push oral disintegrating tablet through foil backing; administer immediately on tongue and oral disintegrating tablet dissolves in seconds; then swallow with saliva; liquid not required.    Admin. Amount: 1  tablet (1 × 4 mg tablet)  Dispense Loc: [facility] OBS              Or  ondansetron (ZOFRAN) injection 4 mg  Dose: 4 mg  Freq: EVERY 6 HOURS PRN Route: IV  PRN Reasons: nausea,vomiting  Start: 06/27/18 1956   Admin Instructions: This is Step 1 of nausea and vomiting management.  If nausea not resolved in 15 minutes, go to Step 2 prochlorperazine (COMPAZINE).  Irritant. For ordered doses up to 4 mg, give IV Push undiluted over 2-5 minutes.    Admin. Amount: 4 mg = 2 mL Conc: 4 mg/2 mL  Dispense Loc: [facility] AD[facility] MS2[facility] OBS  Infused Over: 2-5 Minutes  Volume: 2 mL               polyethylene glycol (MIRALAX/GLYCOLAX) Packet 17 g  Dose: 17 g  Freq: DAILY PRN Route: PO  PRN Reason: constipation  Start: 06/27/18 1958   Admin Instructions: Give in 8oz of  water, juice, or soda. Hold for loose stools.  This is the second step of a three step constipation treatment.  1 Packet = 17 grams. Mixed prescribed dose in 8 ounces of water. Follow with 8 oz. of water.    Admin. Amount: 17 g  Dispense Loc: [facility] ADS MS2[facility] OBS               prochlorperazine (COMPAZINE) injection 5 mg  Dose: 5 mg  Freq: EVERY 6 HOURS PRN Route: IV  PRN Reasons: nausea,vomiting  Start: 06/27/18 1958   Admin Instructions: This is Step 2 of nausea and vomiting management. Give if nausea not resolved 15 minutes after giving ondansetron (ZOFRAN). If nausea not resolved in 15 minutes, go to Step 3 metoclopramide (REGLAN), if ordered.  For ordered doses up to 10 mg, give IV Push undiluted. Each 5mg over 1 minute.    Admin. Amount: 5 mg = 1 mL Conc: 5 mg/mL  Dispense Loc: [facility] ADS MS2[facility] OBS  Infused Over: 1-2 Minutes  Volume: 1 mL              Or  prochlorperazine (COMPAZINE) tablet 5 mg  Dose: 5 mg  Freq: EVERY 6 HOURS PRN Route: PO  PRN Reason: vomiting  Start: 06/27/18 1958   Admin Instructions: This is Step 2 of nausea and vomiting management. Give if nausea not resolved 15 minutes after giving ondansetron (ZOFRAN). If nausea not resolved in 15 minutes, go to Step 3  metoclopramide (REGLAN), if ordered.    Admin. Amount: 1 tablet (1 × 5 mg tablet)  Dispense Loc: [facility] ADS MS2B OBS              Or  prochlorperazine (COMPAZINE) Suppository 12.5 mg  Dose: 12.5 mg  Freq: EVERY 12 HOURS PRN Route: RE  PRN Reasons: nausea,vomiting  Start: 06/27/18 1958   Admin Instructions: This is Step 2 of nausea and vomiting management. Give if nausea not resolved 15 minutes after giving ondansetron (ZOFRAN). If nausea not resolved in 15 minutes, go to Step 3 metoclopramide (REGLAN), if ordered.    Admin. Amount: 0.5 suppository (0.5 × 25 mg suppository)  Dispense Loc: [facility] AD[facility] MS2[facility] OBS               senna-docusate (SENOKOT-S;PERICOLACE) 8.6-50 MG per tablet 1 tablet  Dose: 1 tablet  Freq: 2 TIMES DAILY PRN Route: PO  PRN Reason: constipation  Start: 06/27/18 1958   Admin Instructions: If no bowel movement in 24 hours, increase to 2 tablets PO.  Hold for loose stools.  This is the first step of a three step constipation treatment.    Admin. Amount: 1 tablet  Dispense Loc: RH ADS MS2B OBS              Or  senna-docusate (SENOKOT-S;PERICOLACE) 8.6-50 MG per tablet 2 tablet  Dose: 2 tablet  Freq: 2 TIMES DAILY PRN Route: PO  PRN Reason: constipation  Start: 06/27/18 1958   Admin Instructions: Hold for loose stools.  This is the first step of a three step constipation treatment.    Admin. Amount: 2 tablet  Dispense Loc: RH ADS MS2B OBS               simvastatin (ZOCOR) tablet 10 mg  Dose: 10 mg  Freq: EVERY EVENING Route: PO  Start: 06/27/18 2001   Admin. Amount: 1 tablet (1 × 10 mg tablet)  Last Admin: 06/28/18 2007  Dispense Loc: RH ADS MS2B OBS         2143 (10 mg)-Given        2007 (10 mg)-Given        [ ] 2000           traMADol (ULTRAM) half-tab 25 mg  Dose: 25 mg  Freq: EVERY 6 HOURS PRN Route: PO  PRN Reason: moderate pain  Start: 06/27/18 1959   Admin. Amount: 1 half-tab (1 × 25 mg half-tab)  Last Admin: 06/27/18 2349  Dispense Loc:  ADS MS2B OBS         2349 (25 mg)-Given            Completed  Medications  Medications 18         Dose: 1 tablet  Freq: ONCE Route: PO  Start: 18 151   End: 18 1513   Admin Instructions: Maximum acetaminophen dose from all sources= 75 mg/kg/day not to exceed 4 grams    Admin. Amount: 1 tablet  Last Admin: 18 1513  Dispense Loc: [facility]  Administrations Remainin         1513 (1 tablet)-Given            Discontinued Medications  Medications 18         Start: 18   End: 18   Admin Instructions: Juan Handy : cabinet override    Administrations Remainin                -Med Discontinued                  INTERAGENCY TRANSFER FORM - NOTES (H&P, Discharge Summary, Consults, Procedures, Therapies)   2018                       [facility] OBSERVATION DEPARTMENT: [phone]               History & Physicals      H&P by Ron Gardner DO at 2018  8:08 PM     Author:  Ron Gardner DO Service:  Hospitalist Author Type:  Physician    Filed:  2018  8:08 PM Date of Service:  2018  8:08 PM Creation Time:  2018  8:01 PM    Status:  Signed :  Ron Gardner DO (Physician)         [facility]  Hospitalist H&P    Name: Lali Santos      MRN: [id number]  YOB: 1916    Age: 89 year old  Date of admission: 2018  Primary care provider: Shiva Garcia            Assessment and Plan:   Lali Santos is a 89 year old female with a history of hypertension and coronary artery disease who presents with fall and left fibula fracture.    1.  Left fibula fracture.  Pain medications as needed.  Orthopedic surgery consult.  Physical therapy consult.    2.  Hypertension.  Restart metoprolol.    3.  Hyperlipidemia.  Restart simvastatin.    4.  Coronary artery disease.  Restart simvastatin, aspirin, and metoprolol.    Admit to  observation.  Prophylaxis: Pneumatic compression devices.              Chief Complaint:   Left leg pain.         History of Present Illness:   Lali Santos is a 89 year old female who presents with left leg pain after a fall.  Had a fall in the shower today.  Having left leg pain after the fall.  Her mobility is usually limited to being able to assist with pivoting.  Did not try any pain medications before arriving in the emergency room.  Pain medications given in the emergency room do seem to have helped.  Otherwise has no complaints at this time.            Past Medical History:     Past Medical History:   Diagnosis Date     DJD (degenerative joint disease)      Hypertension      Osteoporosis              Past Surgical History:     Past Surgical History:   Procedure Laterality Date     cardiac artery bypass       CHOLECYSTECTOMY               Social History:     Social History   Substance Use Topics     Smoking status: Not on file     Smokeless tobacco: Not on file     Alcohol use Not on file             Family History:   Mother, father, and 2 sisters with strokes.         Allergies:   No Known Allergies          Medications:     Prior to Admission medications    Medication Sig Last Dose Taking? Auth Provider   aspirin 81 MG chewable tablet Take 81 mg by mouth daily 6/27/2018 at Unknown time Yes Unknown, Entered By History   cholecalciferol 2000 UNITS CAPS Take 1 capsule by mouth daily 6/27/2018 at Unknown time Yes Unknown, Entered By History   metoprolol (LOPRESSOR) 25 MG tablet Take 12.5 mg by mouth every morning  6/27/2018 at Unknown time Yes Unknown, Entered By History   multivitamin (OCUVITE) TABS Take 1 tablet by mouth daily 6/27/2018 at Unknown time Yes Unknown, Entered By History   simvastatin (ZOCOR) 10 MG tablet Take 10 mg by mouth every evening 6/26/2018 at Unknown time Yes Unknown, Entered By History             Review of Systems:   A Comprehensive greater than 10 system review of systems was  carried out.  Pertinent positives and negatives are noted above.  Otherwise negative for contributory information.           Physical Exam:   Blood pressure 121/79, pulse 89, temperature 97.7  F (36.5  C), temperature source Temporal, resp. rate 15, SpO2 91 %.  Wt Readings from Last 1 Encounters:   10/12/15 66.8 kg (147 lb 4.8 oz)     Exam:  GENERAL: No apparent distress. Awake, alert, and fully oriented.  HEENT: Normocephalic, atraumatic. Extraocular movements intact.  CARDIOVASCULAR: Regular rate and rhythm without murmurs or rubs. No S3.  PULMONARY: Clear to auscultation bilaterally.  ABDOMINAL: Soft, non-tender, non-distended. Bowel sounds normoactive.   EXTREMITIES: No cyanosis or clubbing. No appreciable edema.  NEUROLOGICAL: CN 2-12 grossly intact, no focal neurological deficits.  DERMATOLOGICAL: No rash, ulcer, bruising, nor jaundice.          Data:       Laboratory:[KR1.1]  No results for input(s): WBC, HGB, HCT, MCV, PLT in the last 168 hours.  No results for input(s): NA, POTASSIUM, CHLORIDE, CO2, ANIONGAP, GLC, BUN, CR, GFRESTIMATED, GFRESTBLACK, ANGELINA in the last 168 hours.[KR1.2]  No results for input(s): CULT in the last 168 hours.    Imaging:  Recent Results (from the past 24 hour(s))   XR Knee Left 3 Views    Narrative    XR KNEE LT 3 VW 6/27/2018 3:58 PM    COMPARISON: None.    HISTORY: Trauma.      Impression    IMPRESSION: Osteopenia about the left knee. There is a minimally  displaced proximal fibular fracture. No other fractures are suspected  in the left knee. Severe osteoarthritis with complete loss of joint  space in the lateral compartment as well as a small knee effusion and  chondrocalcinosis in the medial compartment.    YAMILET KEARNS MD[KR1.1]            Revision History        User Key Date/Time User Provider Type Action    > KR1.2 6/27/2018  8:08 PM Ron Gardner, DO Physician Sign     KR1.1 6/27/2018  8:01 PM Ron Gardner, DO Physician                   Discharge Summaries      No notes of this type exist for this encounter.         Consult Notes      Consults by Carole Conn LSW at 6/28/2018  5:11 PM     Author:  Carole Conn LSW Service:  (none) Author Type:      Filed:  6/28/2018  5:11 PM Date of Service:  6/28/2018  5:11 PM Creation Time:  6/28/2018  4:58 PM    Status:  Signed :  Carole Conn LSW ()         .Care Transition Initial Assessment -   Reason For Consult: discharge planning. Per consult information it was noted that per daughter pt would be unable to return to her current residence at the [facility] due to her injury. Noted PT assessment pending.     Active Problems:    Left fibular fracture       DATA  Lives With: alone  Living Arrangements: apartment  Description of Support System: Supportive, Involved  Who is your support system?: Children  Support Assessment: Adequate family and caregiver support.       Resources List: Skilled Nursing Facility     Quality Of Family Relationships: supportive, involved      INTERVENTION     Spoke this afternoon to pt's daughter, Sue who affirmed consideration of pt's transfer to rehab facility, noting also that they will want to wait until she has PT assessment to make the determination. She notes also that she has been in contact today with staff at [facility] informing of pt's hospitalization and possible need for her transfer to rehab facility. Daughter also acknowledges understanding of the current observation care status and that pt's rehab stay would be private pay as she does not tehn meet Medicare/SNF criteria, she also acknowledges understanding of the $5000 upfront cost. Based on discussion, referral has been made to [facility] should rehab stay need be determined per therapy recommendation... Daughter notes that pt does not ambulate ( for years) she has been able to self-transfer from lift chair to power chair which she uses within her apartment, per daughter no  current home care services for pt.            ASSESSMENT  Cognitive Status:  alert     PLAN  Financial costs for the patient includes: private pay cost for rehab facility due to observation status.  Patient given options and choices for discharge: Yes     Will await PT assessment and recommendation, will assess further with pt/daughter tomorrow regarding pt's home management in connection with discharge recommendation.[GJ1.1]              Revision History        User Key Date/Time User Provider Type Action    > GJ1.1 6/28/2018  5:11 PM Carole Conn LSW  Sign            Consults by Tree Conn APRN CNP at 6/28/2018  8:58 AM     Author:  Tree Conn APRN CNP Service:  Orthopedics Author Type:  Nurse Practitioner    Filed:  6/28/2018  1:36 PM Date of Service:  6/28/2018  8:58 AM Creation Time:  6/28/2018  8:58 AM    Status:  Attested :  Tree Conn APRN CNP (Nurse Practitioner)    Cosigner:  Alirio Andersen MD at 6/28/2018  4:39 PM         Consult Orders:    1. Orthopedic Surgery IP Consult: Patient to be seen: Routine - within 24 hours; left fibula fracture; Consultant may enter orders: Yes [[id number]] ordered by Ron Gardner DO at 06/27/18 2000           Attestation signed by Alirio Andersen MD at 6/28/2018  4:39 PM        Physician Attestation   Alirio SANZ, saw and evaluated Lali Santos as part of a shared visit.  I have reviewed and discussed with the advanced practice provider their history, physical and plan.    I personally reviewed the vital signs, medications, labs and imaging.    My key history or physical exam findings: Agree with NP note. Briefly, patient is a 89 year old F who had a slip and fall in the shower on 6/27/18. She had pain in her L knee and L leg. She was brought to the ED where xrays demonstrated a nondisplaced proximal fibula fracture. Patient at baseline uses a wheelchair for ambulation and only stands on her LLE for  pivoting and transfering. She does not recall if she had an ankle twisting injury during her fall. She does have a baseline L foot drop. She was placed into a knee immobilizer in the ED and admitted for obs. No numbness/tingling. No other injuries. On exam she is in NAD. She has ttp over the proximal fibula of her LLE. No ttp about the ankle. +DF/PF/EHL. Sensation intact. Foot wwp. No evidence of ankle instability. Xrays of the L knee demonstrate severe DJD with a proximal fibula fracture that is nondisplaced. L tib/fib and L ankle xrays without evidence of fracture.    Key management decisions made by me: L proximal fibula fracture, nondisplaced. Plan for nonoperative management. Recommend CAM boot for protection and WBAT on her LLE during pivoting. PT/OT for ROM. Pain control. F/u with me in 3 weeks.    Alirio Andersen  Date of Service (when I saw the patient): 06/28/18                               [facility] Orthopedic Consultation    Lali Santos MRN# [id number]   Age: 89 year old YOB: 1916     Date of Admission:  6/27/2018    Reason for consult:[EJ1.1] Left proximal fibula fracture[EJ1.2]        Requesting physician:[EJ1.1] Dr. Gardner[EJ1.2]       Level of consult:[EJ1.1] Consult, follow and place orders[EJ1.2]           Assessment and Plan:   Assessment:[EJ1.1]   1) Minimally displaced left proximal fibula fracture with left knee osteoarthritis[EJ1.2]          Plan:[EJ1.1]   1) I will discuss plan with Ortho Trauma Surgeon, Dr. Andersen when he is out of surgery but this fracture will most likely be managed non-operatively.[EJ1.2]   2) Further plan to follow.[EJ1.3]   3) spoke with Dr. Andersen- no additional fractures seen in left foot or left ankle.  4) No surgical intervention needed for proximal fibula fracture.   5) Patient can be WBAT on LLE- will order Orthotics Consult to fit with CAM boot to LLE for better support with pivot transfers from bed to wheelchair.   6) No Knee  Immobilizer needed, ok to do gentle ROM at left knee as tolerated.   7) Patient should make follow up appointment with Dr. Andersen at [facility] in [place] in 3 weeks. Please call Dr. Andersen's care coordinator, Daphne, at [phone] to schedule this appointment.[EJ1.4]            Chief Complaint:[EJ1.1]   Left knee and leg pain[EJ1.2]          History of Present Illness:   This patient is a 89 year old female who presents with the following condition requiring a hospital admission:[EJ1.1]     Patient reports that she was in her assisted living apartment yesterday and a caregiver was helping her in the shower when she slipped and fell and was apparently lowered to the floor. She sustained a skin tear to her left lower leg. EMS was called and a dressing was applied to the skin tear but later on she began reporting increasing pain in her left knee and left leg. She was brought to ED for further evaluation. X-rays in ED revealed a minimally displaced fracture of the left proximal fibula along with severe osteoarthritis and chondrocalcinosis but no other fractures about the knee. Patient is seen in her hospital room this morning. She reports 4/10 pain in left knee and just below left knee in region of proximal fibula at rest but this increases to 9/10 with movement of her left leg. She denies pain anywhere other than her left knee and leg. She denies any numbness or tingling in her BLE. She does state she has some baseline left foot drop. She is able to wiggle her toes, is able to plantar flex but has diminished dorsiflexion. She is unable to bend her left knee past 30 degrees due to pain. She does have some swelling about the left knee. She states that she is only able to transfer from bed to powerchair at baseline.[EJ1.3]           Past Medical History:[EJ1.1]     Past Medical History:   Diagnosis Date     DJD (degenerative joint disease)      Hypertension      Osteoporosis[EJ1.2]              Past  Surgical History:[EJ1.1]     Past Surgical History:   Procedure Laterality Date     cardiac artery bypass       CHOLECYSTECTOMY[EJ1.2]               Social History:[EJ1.1]     Social History   Substance Use Topics     Smoking status: Not on file     Smokeless tobacco: Not on file     Alcohol use Not on file[EJ1.2]             Family History:[EJ1.1]   No family history on file.[EJ1.2]          Immunizations:     VACCINE/DOSE   Diptheria   DPT   DTAP   HBIG   Hepatitis A   Hepatitis B   HIB   Influenza   Measles   Meningococcal   MMR   Mumps   Pneumococcal   Polio   Rubella   Small Pox   TDAP   Varicella   Zoster             Allergies:[EJ1.1]   No Known Allergies[EJ1.2]          Medications:[EJ1.1]     Current Facility-Administered Medications   Medication     acetaminophen (TYLENOL) Suppository 650 mg     acetaminophen (TYLENOL) tablet 650 mg     aspirin chewable tablet 81 mg     bisacodyl (DULCOLAX) Suppository 10 mg     cefTRIAXone (ROCEPHIN) 1 g vial to attach to  mL bag for ADULTS or NS 50 mL bag for PEDS     cholecalciferol (vitamin D3) tablet 2,000 Units     gabapentin (NEURONTIN) capsule 300 mg     melatonin tablet 1 mg     metoprolol tartrate (LOPRESSOR) half-tab 12.5 mg     naloxone (NARCAN) injection 0.1-0.4 mg     ondansetron (ZOFRAN-ODT) ODT tab 4 mg    Or     ondansetron (ZOFRAN) injection 4 mg     polyethylene glycol (MIRALAX/GLYCOLAX) Packet 17 g     prochlorperazine (COMPAZINE) injection 5 mg    Or     prochlorperazine (COMPAZINE) tablet 5 mg    Or     prochlorperazine (COMPAZINE) Suppository 12.5 mg     senna-docusate (SENOKOT-S;PERICOLACE) 8.6-50 MG per tablet 1 tablet    Or     senna-docusate (SENOKOT-S;PERICOLACE) 8.6-50 MG per tablet 2 tablet     simvastatin (ZOCOR) tablet 10 mg     traMADol (ULTRAM) half-tab 25 mg[EJ1.2]             Review of Systems:[EJ1.1]   CV: NEGATIVE for chest pain, palpitations or peripheral edema  C: NEGATIVE for fever, chills, change in weight  E/M: NEGATIVE for  ear, mouth and throat problems  R: NEGATIVE for significant cough or SOB[EJ1.3]          Physical Exam:   All vitals have been reviewed[EJ1.1]  Patient Vitals for the past 24 hrs:   BP Temp Temp src Pulse Heart Rate Resp SpO2   06/28/18 0750 - - - - - - 93 %   06/28/18 0741 125/67 98.5  F (36.9  C) Oral - 97 14 (!) 88 %   06/28/18 0421 103/66 97.5  F (36.4  C) Oral - 78 14 91 %   06/27/18 2340 118/89 98  F (36.7  C) Oral - 88 14 93 %   06/27/18 2040 96/79 98.2  F (36.8  C) Oral - 54 12 90 %   06/27/18 1530 121/79 - - - - - 91 %   06/27/18 1508 124/80 97.7  F (36.5  C) Temporal 89 89 15 92 %[EJ1.2]     No intake or output data in the 24 hours ending 06/28/18 0859[EJ1.1]  Musculoskeletal:   LLE: In Knee Immobilizer. Moderate swelling about the left knee, minor skin abrasions over patella. Skin tear over distal anterior shin with some bleeding- covered with dressing.   +TTP along proximal left fibula and lateral left knee  Neg TTP to left hip or left ankle  - Only able to tolerate about 30 degrees flexion at left knee d/t pain, unable to complete SLR   - Baseline left foot drop otherwise distal CMS intact  - +PF/EHL, diminished dorsiflexion  - 1+ DP pulse, cap refill < 3 sec[EJ1.3]              Data:   All laboratory data reviewed[EJ1.1]  Results for orders placed or performed during the hospital encounter of 06/27/18   XR Knee Left 3 Views    Narrative    XR KNEE LT 3 VW 6/27/2018 3:58 PM    COMPARISON: None.    HISTORY: Trauma.      Impression    IMPRESSION: Osteopenia about the left knee. There is a minimally  displaced proximal fibular fracture. No other fractures are suspected  in the left knee. Severe osteoarthritis with complete loss of joint  space in the lateral compartment as well as a small knee effusion and  chondrocalcinosis in the medial compartment.    YAMILET KEARNS MD   UA with Microscopic reflex to Culture   Result Value Ref Range    Color Urine [place]     Appearance Urine Cloudy     Glucose Urine  Negative NEG^Negative mg/dL    Bilirubin Urine Negative NEG^Negative    Ketones Urine Negative NEG^Negative mg/dL    Specific Gravity Urine 1.016 1.003 - 1.035    Blood Urine Large (A) NEG^Negative    pH Urine 5.0 5.0 - 7.0 pH    Protein Albumin Urine 100 (A) NEG^Negative mg/dL    Urobilinogen mg/dL 2.0 0.0 - 2.0 mg/dL    Nitrite Urine Negative NEG^Negative    Leukocyte Esterase Urine Moderate (A) NEG^Negative    Source Midstream Urine     WBC Urine 169 (H) 0 - 5 /HPF    RBC Urine >182 (H) 0 - 2 /HPF    Bacteria Urine Few (A) NEG^Negative /HPF    Squamous Epithelial /HPF Urine 1 0 - 1 /HPF    Mucous Urine Present (A) NEG^Negative /LPF   Basic metabolic panel   Result Value Ref Range    Sodium 137 133 - 144 mmol/L    Potassium 4.3 3.4 - 5.3 mmol/L    Chloride 106 94 - 109 mmol/L    Carbon Dioxide 25 20 - 32 mmol/L    Anion Gap 6 3 - 14 mmol/L    Glucose 133 (H) 70 - 99 mg/dL    Urea Nitrogen 23 7 - 30 mg/dL    Creatinine 0.93 0.52 - 1.04 mg/dL    GFR Estimate 55 (L) >60 mL/min/1.7m2    GFR Estimate If Black 67 >60 mL/min/1.7m2    Calcium 8.4 (L) 8.5 - 10.1 mg/dL   CBC with platelets   Result Value Ref Range    WBC 9.6 4.0 - 11.0 10e9/L    RBC Count 4.36 3.8 - 5.2 10e12/L    Hemoglobin 13.7 11.7 - 15.7 g/dL    Hematocrit 43.3 35.0 - 47.0 %    MCV 99 78 - 100 fl    MCH 31.4 26.5 - 33.0 pg    MCHC 31.6 31.5 - 36.5 g/dL    RDW 13.7 10.0 - 15.0 %    Platelet Count 171 150 - 450 10e9/L   Urine Culture Aerobic Bacterial   Result Value Ref Range    Specimen Description Midstream Urine     Special Requests Specimen received in preservative     Culture Micro PENDING[EJ1.2]           Attestation:  I have reviewed today's vital signs, notes, medications, labs and imaging with [EJ1.1] Nathaly[EJ1.3].  Amount of time performed on this consult:[EJ1.1] 35[EJ1.3] minutes.[EJ1.1]    Tree Conn, APRN CNP[EJ1.2]        Revision History        User Key Date/Time User Provider Type Action    > EJ1.4 6/28/2018  1:36 PM Fabien  Tree Eric, APRN CNP Nurse Practitioner Sign     EJ1.3 6/28/2018  9:41 AM Tree Conn APRN CNP Nurse Practitioner Sign     EJ1.2 6/28/2018  8:59 AM Tree Conn APRN CNP Nurse Practitioner      EJ1.1 6/28/2018  8:58 AM Tree Conn APRN CNP Nurse Practitioner                      Progress Notes - Physician (Notes for yesterday and today)      Progress Notes by Imleda Hunter PA-C at 6/28/2018 11:24 AM     Author:  Imelda Hunter PA-C Service:  Internal Medicine Author Type:  Physician Assistant - BELEM    Filed:  6/28/2018 12:28 PM Date of Service:  6/28/2018 11:24 AM Creation Time:  6/28/2018 11:24 AM    Status:  Addendum :  Imelda Hunter PA-C (Physician Assistant Mahendra PATRICIA)         [facility]  Observation Unit  Progress Note    Date of Service: 6/28/2018    Patient: Lali Santos  MRN: [id number]  Admission Date: 6/27/2018  Hospital Day # 1    Assessment & Plan: Lali Santos is a 89 year old female who presented from her care facility after a witnessed fall with left leg pain and found to have a left fibula fracture.     Acute Left Fibula Fracture - patient suffered a minor fall while in the shower with a helper on 6/27.  The patient was slowly lowered/slid down to the floor and suffered a skin tear to the left leg and left leg pain.  No LOC or head injury reported.[EM1.1]  Left knee xray with a minimally displaced proximal fibular fracture with severe OA and small knee effusion.[EM1.2]  -[EM1.1] Orthopedic Surgery consulted and recommend left tib/fib and left ankle xrays.  Still deciding on operative vs non operative management.    - PT consult  - SW consult  - start scheduled Tylenol with prn Ultram[EM1.2]    Abnormal UA - denies irritative voiding symptoms, afebrile with normal wbc.    - will give a dose of IV Ceftriaxone for possible UTI and follow up on urine culture.    Hx CAD - s/p 3 vessel CABG in 1982.  No chest pain.  Continue home  "ASA[EM1.1] and Simvastatin[EM1.3].    HTN - stable.  Continue home Lopressor[EM1.1].[EM1.3]    Paroxysmal vs Chronic Atrial Fibrillation - irregular heart beat noted on my exam this morning prompting an EKG which reveals Atrial fibrillation with RBBB and Left Anterior Fascicular Block.  Chart review performed revealing a hx of paroxysmal afib, not currently on anticoagulation due to her advanced age and high fall risk.  - monitor on telemetry  - continue home Lopressor and ASA    # Pain Assessment:  Current Pain Score 6/28/2018   Patient currently in pain? denies   Pain score (0-10) -   Pain location -   Pain descriptors -   - Lali is experiencing pain due to left fibula fracture. Pain management was discussed and the plan was created in a collaborative fashion.  Lali's response to the current recommendations: engaged  - start scheduled Tylenol.  Prn Ultram    CODE:[EM1.1] DNR/DNI; confirmed with daughter at the bedside[EM1.2]  DVT: scd  Diet/fluids: npo for now until surgery evaluation complete  Disposition: will likely need TCU, SW consulted.    Imelda Hunter MS PA-C  Hospitalist Physician Assistant  [facility]  Pager: [phone]      Subjective & Interval Hx:    Patient reports mild pain in her LLE worse with any movement.  Denies numbness or tingling.  No chest pain, shortness of breath, dysuria or abdominal pain.  Remembers the fall in the shower and denies LOC or any preceding lightheadedness or chest pain.     Last 24 hr care team notes reviewed.   ROS:  4 point ROS including Respiratory, CV, GI and , other than that noted in the HPI, is negative.    Physical Exam:    Blood pressure 125/67, pulse 89, temperature 98.5  F (36.9  C), temperature source Oral, resp. rate 14, height 1.6 m (5' 3\"), weight 64.1 kg (141 lb 4.8 oz), SpO2 93 %.  General: Alert, interactive, NAD, lying in bed, pleasant  HEENT: AT/NC, sclera anicteric, PERRL  Resp: clear to auscultation bilaterally, no crackles or " wheezes  Cardiac: regular rate and rhythm, no murmur  Abdomen: Soft, nontender, nondistended. +BS.   Extremities: LLE in immobilizer with guaze wrap underneath.  Skin: Warm and dry[EM1.1], abrasion and ecchymosis to the left knee with mild swelling.  Left anterior shin with bruising, ecchymosis and superficial skin tears.[EM1.2]    Neuro: Alert & oriented able to recall events from yesterday.  FROM testing not performed.[EM1.1]  Known chronic left foot drop per family.[EM1.2]    Labs & Images:  Reviewed in Epic   Medications:    Current Facility-Administered Medications   Medication     acetaminophen (TYLENOL) Suppository 650 mg     acetaminophen (TYLENOL) tablet 650 mg     aspirin chewable tablet 81 mg     bisacodyl (DULCOLAX) Suppository 10 mg     cefTRIAXone (ROCEPHIN) 1 g vial to attach to  mL bag for ADULTS or NS 50 mL bag for PEDS     cholecalciferol (vitamin D3) tablet 2,000 Units     gabapentin (NEURONTIN) capsule 300 mg     melatonin tablet 1 mg     metoprolol tartrate (LOPRESSOR) half-tab 12.5 mg     naloxone (NARCAN) injection 0.1-0.4 mg     ondansetron (ZOFRAN-ODT) ODT tab 4 mg    Or     ondansetron (ZOFRAN) injection 4 mg     polyethylene glycol (MIRALAX/GLYCOLAX) Packet 17 g     prochlorperazine (COMPAZINE) injection 5 mg    Or     prochlorperazine (COMPAZINE) tablet 5 mg    Or     prochlorperazine (COMPAZINE) Suppository 12.5 mg     senna-docusate (SENOKOT-S;PERICOLACE) 8.6-50 MG per tablet 1 tablet    Or     senna-docusate (SENOKOT-S;PERICOLACE) 8.6-50 MG per tablet 2 tablet     simvastatin (ZOCOR) tablet 10 mg     traMADol (ULTRAM) half-tab 25 mg[EM1.1]          Revision History        User Key Date/Time User Provider Type Action    > [N/A] 6/28/2018 12:28 PM Imelda Hunter PA-C Physician Assistant - C Addend     [N/A] 6/28/2018 12:27 PM Imelda Hunter PA-C Physician Assistant - C Addend     EM1.2 6/28/2018 12:27 PM Imelda Hunter PA-C Physician Assistant - C Sign     EM1.3  6/28/2018 11:49 AM Imelda Hunter PA-C Physician Assistant - BELEM      EM1.1 6/28/2018 11:24 AM Imelda Hunter PA-C Physician Assistant - C             ED Provider Notes by Ankur Bo MD at 6/27/2018  2:52 PM     Author:  Ankur Bo MD Service:  Emergency Medicine Author Type:  Physician    Filed:  6/28/2018  8:59 AM Date of Service:  6/27/2018  2:52 PM Creation Time:  6/27/2018  2:58 PM    Status:  Signed :  Ankur Bo MD (Physician)           History     Chief Complaint:  Leg Pain    HPI   Lali Santos is a 89 year old female who presents with leg pain. The patient suffered a minor fall while in the shower with a helper at approximately 1145 this morning; the patient was slowly lowered/slid down to the floor and suffered a skin tear to the left leg. EMS was called soon thereafter for this and placed a dressing at the area but felt that patient did not require transport at that time. Some time later, the staff at her assisted living facility called EMS once more as patient began to have complaints of increased left leg pain. On arrival patient has no complaints other than left knee and leg pain. No other symptoms, injuries, or concerns reported. Patient was not given any medications prior to arrival.    Allergies:  No known drug allergies.    Medications:    Norvasc  Aspirin  Cholecalciferol  Metoprolol  Multivitamin  Zocor     Past Medical History:[BW1.1]    Coronary atherosclerosis of unspecified type of vessel, native or graft  Other and unspecified hyperlipidemia  Osteoarthrosis, unspecified whether generalized or localized, pelvic region and thigh  Unspecified essential hypertension  Unspecified disorder of thyroid  Lumbago  DJD (degenerative joint disease) of knee  Acute Appendicitis[BW1.2]    Past Surgical History:    Coronary artery bypass graft[BW1.1]  CATARACT REMOVAL  CVL CORONARY ANGIOGRAM  CORONARY ARTERY BYPASS GRAFT  INGROWN TOENAIL REMOVAL  LAP  CHOLECYSTECTOMY  APPENDECTOMY  HYSTERECTOMY[BW1.2]    Family History:[BW1.1]    Stroke- Brother  Heart Disease-Brother  Heart Disease-Father  Stroke-Mother  Cancer-Sister  Stroke-Sister  Cancer-Sister  Arthritis-Son[BW1.2]    Social History:  Smoking status: Never smoker  Alcohol use: No  Marital Status:        Review of Systems   Constitutional: Negative for[BW1.1] fever[BW1.3].   Musculoskeletal: Positive for[BW1.1] arthralgias[BW1.3] and[BW1.1] gait problem[BW1.3]. Negative for[BW1.1] back pain[BW1.3] and[BW1.1] myalgias[BW1.3].   Skin: Negative for[BW1.1] color change[BW1.3] and[BW1.1] wound[BW1.3].   Neurological: Negative for[BW1.1] weakness[BW1.3] and[BW1.1] numbness[BW1.3].[BW1.1]   All other systems reviewed and are negative[BW1.3].    Physical Exam[BW1.1]     Patient Vitals for the past 24 hrs:   BP Temp Temp src Pulse Heart Rate Resp SpO2   06/27/18 1508 124/80 97.7  F (36.5  C) Temporal 89 89 15 92 %[BW1.3]         Physical Exam[BW1.1]  General: Patient is alert and cooperative.  HENT:  No trauma.  Eyes: EOMI. Normal conjunctiva.  Neck:  Normal range of motion and appearance. No tenderness.   Cardiovascular:  Normal rate, regular rhythm and normal heart sounds.   Pulmonary/Chest:  Effort normal. No wheezing or crackles.  Abdominal: Soft. No distension or tenderness.     Musculoskeletal: LLE notable for tenderness and swelling of knee with limited mobility due to pain.  Superficial skin tear anterior shin, no active bleeding.    Neurological: oriented, no focal weakness. Chronic drop foot left side.   Skin:  No lacerations.  Psychiatric: Normal mood and affect. Normal behavior and judgement.[BI1.1]      Emergency Department Course   Imaging:[BW1.1]  XR Knee, Left, 3 views:  IMPRESSION: Osteopenia about the left knee. There is a minimally displaced proximal fibular fracture. No other fractures are suspected in the left knee. Severe osteoarthritis with complete loss of joint  space in the lateral  compartment as well as a small knee effusion and chondrocalcinosis in the medial compartment.  Report per radiology.[BW1.4]     Interventions:[BW1.1]  (1513) Hydrocodone-Acetaminophen 5-325 mg, PO[BW1.4]    Emergency Department Course:  Nursing notes and vitals reviewed.  (5059) I performed an exam of the patient as documented above.    The patient was sent for a x-ray while in the emergency department, findings above. \[BW1.1]    Findings and plan explained to the patient who consents to admission.   ([BW1.3]1637[BW1.5]) I discussed the patient with [BW1.3] Jj[BW1.5] of the hospitalist service, who will admit the patient to a[BW1.3] OBS[BW1.5] bed for further monitoring, evaluation, and treatment.[BW1.3]    Impression & Plan    Medical Decision Making:[BW1.1]  He is 89-year-old female arrives by ambulance for evaluation of the left knee and lower leg injury sustained secondary to fall in shower.  She was accompanied by her PCA at the time.  She reports that her knee gave out and she fell in the process of extricating her from the shower it sounds like her knee was bent causing worsening pain.  She did not strike her head or lose consciousness.  There was no syncopal incident.  Physical examination reveals marked swelling and tenderness of the left knee as well as superficial skin tears to the lower x-ray discloses a nondisplaced proximal fibula fracture.  She was placed in a knee immobilizer required admission for supportive cares and eventual placement into a transitional care or nursing home type facility.  She lives in a senior apartment presently.[BI1.2]    Diagnosis:[BW1.1]    ICD-10-CM   1. Closed fracture of proximal end of left fibula, unspecified fracture morphology, initial encounter S82.832A[BW1.6]       Disposition:[BW1.1]  Patient is admitted to a[BW1.3] OBS[BW1.5] bed under the care of [BW1.3] Jj[BW1.5].[BW1.3]           I, Richard Veloz, am serving as a scribe on 6/27/2018 at  2:59 PM to personally document services performed by Dr. Bo based on my observations and the provider's statements to me.         Richard Veloz  6/27/2018   [facility] EMERGENCY DEPARTMENT[BW1.1]       Ankur Bo MD  06/28/18 0859  [BI1.3]     Revision History        User Key Date/Time User Provider Type Action    > BI1.3 6/28/2018  8:59 AM Ankur Bo MD Physician Sign     BI1.2 6/28/2018  8:56 AM Ankur Bo MD Physician Share     [N/A] 6/27/2018  4:38 PM Eddy, Richard Scribe Share     BW1.5 6/27/2018  4:38 PM Magdiel, Richard Scribe Share     BW1.6 6/27/2018  4:14 PM Magdiel, Branford Scribe Share     BW1.3 6/27/2018  4:13 PM Magdiel, Richard Scribe      BW1.4 6/27/2018  4:06 PM Eddy, Branford Scribe Share     BI1.1 6/27/2018  3:41 PM Ankur Bo MD Physician Share     BW1.2 6/27/2018  3:09 PM Magdiel, Branford Scribe Share     BW1.1 6/27/2018  3:04 PM Eddy, Richard Scribe Share                  Procedure Notes     No notes of this type exist for this encounter.         Progress Notes - Therapies (Notes from 06/26/18 through 06/29/18)      Progress Notes by Julieth Bacon PT at 6/29/2018 12:04 PM     Author:  Julieth Bacon PT Service:  (none) Author Type:  Physical Therapist    Filed:  6/29/2018 12:04 PM Date of Service:  6/29/2018 12:04 PM Creation Time:  6/29/2018 12:04 PM    Status:  Signed :  Julieth Bacon PT (Physical Therapist)          06/29/18 1101   Quick Adds   Type of Visit Initial PT Evaluation   Living Environment   Lives With alone   Living Arrangements apartment  (AV villa)   Home Accessibility no concerns   Transportation Available car;family or friend will provide   Living Environment Comment Pt lives in apartment   Self-Care   Usual Activity Tolerance fair   Current Activity Tolerance poor   Regular Exercise no   Equipment Currently Used at Home wheelchair, power   Activity/Exercise/Self-Care Comment Pt transfers from bed to W/C and  W/C to toilet on her own without use of FWW or AD.   Functional Level Prior   Ambulation 4-->completely dependent   Transferring 0-->independent   Toileting 1-->assistive equipment  (grab)   Bathing 2-->assistive person   Dressing 0-->independent   Eating 0-->independent   Communication 0-->understands/communicates without difficulty   Swallowing 0-->swallows foods/liquids without difficulty   Cognition 1 - attention or memory deficits   Fall history within last six months yes   Number of times patient has fallen within last six months 4   Prior Functional Level Comment Pt recives 2 meals per day, and meals on wheels. Delivery to her room.   General Information   Onset of Illness/Injury or Date of Surgery - Date 06/28/18   Referring Physician Jj   Patient/Family Goals Statement Pt is hoping to TCU.    Pertinent History of Current Problem (include personal factors and/or comorbidities that impact the POC) Lali Santos is a 89 year old female who presented from her care facility after a witnessed fall with left leg pain and found to have a left fibula fracture. 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   Precautions/Limitations fall precautions   Weight-Bearing Status - LLE weight-bearing as tolerated   Cognitive Status Examination   Orientation person;place   Level of Consciousness alert   Follows Commands and Answers Questions 100% of the time   Personal Safety and Judgment impaired   Memory impaired   Pain Assessment   Patient Currently in Pain Yes, see Vital Sign flowsheet   Integumentary/Edema   Integumentary/Edema Comments knee joint effusion, skin tear on shin(currently covered)   Posture    Posture Forward head position;Protracted shoulders   Range of Motion (ROM)   ROM Comment painful with attempts to knee flexion past 90 degrees   Strength   Strength Comments 3-/5 for hip flexion, 3/5 quads   Bed Mobility   Bed Mobility Comments min/mod A   Transfer Skills   Transfer Comments min/mod A X 2  "with Nadine Steady   Gait   Gait Comments unable   General Therapy Interventions   Planned Therapy Interventions orthotic fitting/training;bed mobility training;transfer training;progressive activity/exercise;risk factor education   Clinical Impression   Criteria for Skilled Therapeutic Intervention yes, treatment indicated   PT Diagnosis decreased functional mobility status post L fibular fracture   Influenced by the following impairments pain, decreased ROM, decreased strength   Functional limitations due to impairments decreased bed mob, transfers   Clinical Presentation Stable/Uncomplicated   Clinical Presentation Rationale improving upon admit   Clinical Decision Making (Complexity) Low complexity   Therapy Frequency` daily   Predicted Duration of Therapy Intervention (days/wks) 1 day for treat and recs   Anticipated Discharge Disposition Transitional Care Facility   Risk & Benefits of therapy have been explained Yes   Patient, Family & other staff in agreement with plan of care Yes   [facility] [employer] \"6 Clicks\"   2016, Trustees of [facility], under license to [employer].  All rights reserved.   6 Clicks Short Forms Basic Mobility Inpatient Short Form   [facility] [employer]  \"6 Clicks\" V.2 Basic Mobility Inpatient Short Form   1. Turning from your back to your side while in a flat bed without using bedrails? 2 - A Lot   2. Moving from lying on your back to sitting on the side of a flat bed without using bedrails? 2 - A Lot   3. Moving to and from a bed to a chair (including a wheelchair)? 2 - A Lot   4. Standing up from a chair using your arms (e.g., wheelchair, or bedside chair)? 2 - A Lot   5. To walk in hospital room? 1 - Total   6. Climbing 3-5 steps with a railing? 1 - Total   Basic Mobility Raw Score (Score out of 24.Lower scores equate to lower levels of function) 10   Total Evaluation Time   Total Evaluation Time (Minutes) 10[MP1.1]        Revision History        User Key Date/Time " User Provider Type Action    > MP1.1 6/29/2018 12:04 PM Julieth Bacon, PT Physical Therapist Sign                                                      INTERAGENCY TRANSFER FORM - LAB / IMAGING / EKG / EMG RESULTS   6/27/2018                       [facility] OBSERVATION DEPARTMENT: [phone]            Unresulted Labs     None         Lab Results - 3 Days      Urine Culture Aerobic Bacterial [[id number]]  Resulted: 06/28/18 2150, Result status: Final result    Ordering provider: Ron Gardner,   06/27/18 1826 Resulting lab: [facility]    Specimen Information    Type Source Collected On   Midstream Urine  06/27/18 1826          Components       Value Reference Range Flag Lab   Specimen Description Midstream Urine      Special Requests Specimen received in preservative   75   Culture Micro No growth   225            Basic metabolic panel [[id number]] (Abnormal)  Resulted: 06/27/18 2056, Result status: Final result    Ordering provider: Ron Gardner,   06/27/18 2000 Resulting lab: [facility]    Specimen Information    Type Source Collected On   Blood  06/27/18 2028          Components       Value Reference Range Flag Lab   Sodium 137 133 - 144 mmol/L  FrRdHs   Potassium 4.3 3.4 - 5.3 mmol/L  FrRdHs   Chloride 106 94 - 109 mmol/L  FrRdHs   Carbon Dioxide 25 20 - 32 mmol/L  FrRdHs   Anion Gap 6 3 - 14 mmol/L  FrRdHs   Glucose 133 70 - 99 mg/dL H FrRdHs   Urea Nitrogen 23 7 - 30 mg/dL  FrRdHs   Creatinine 0.93 0.52 - 1.04 mg/dL  FrRdHs   GFR Estimate 55 >60 mL/min/1.7m2 L FrRd   Comment:  Non  GFR Calc   GFR Estimate If Black 67 >60 mL/min/1.7m2  FrRd   Comment:  African American GFR Calc   Calcium 8.4 8.5 - 10.1 mg/dL L FrRdHs            CBC with platelets [[id number]]  Resulted: 06/27/18 2042, Result status: Final result    Ordering provider: Ron Gardner,   06/27/18 2000 Resulting lab: [facility]     Specimen Information    Type Source Collected On   Blood  06/27/18 2028          Components       Value Reference Range Flag Lab   WBC 9.6 4.0 - 11.0 10e9/L  FrRdHs   RBC Count 4.36 3.8 - 5.2 10e12/L  FrRdHs   Hemoglobin 13.7 11.7 - 15.7 g/dL  FrRdHs   Hematocrit 43.3 35.0 - 47.0 %  FrRdHs   MCV 99 78 - 100 fl  FrRdHs   MCH 31.4 26.5 - 33.0 pg  FrRdHs   MCHC 31.6 31.5 - 36.5 g/dL  FrRdHs   RDW 13.7 10.0 - 15.0 %  FrRdHs   Platelet Count 171 150 - 450 10e9/L  FrRdHs            UA with Microscopic reflex to Culture [[id number]] (Abnormal)  Resulted: 06/27/18 1844, Result status: Final result    Ordering provider: Ankur Bo MD  06/27/18 1818 Resulting lab: [facility]    Specimen Information    Type Source Collected On   Midstream Urine  06/27/18 1826          Components       Value Reference Range Flag Lab   Color Urine [place]   FrRdHs   Appearance Urine Cloudy   FrRdHs   Glucose Urine Negative NEG^Negative mg/dL  FrRdHs   Bilirubin Urine Negative NEG^Negative  FrRdHs   Ketones Urine Negative NEG^Negative mg/dL  FrRdHs   Specific Gravity Urine 1.016 1.003 - 1.035  FrRdHs   Blood Urine Large NEG^Negative A FrRdHs   pH Urine 5.0 5.0 - 7.0 pH  FrRdHs   Protein Albumin Urine 100 NEG^Negative mg/dL A FrRdHs   Urobilinogen mg/dL 2.0 0.0 - 2.0 mg/dL  FrRdHs   Nitrite Urine Negative NEG^Negative  FrRdHs   Leukocyte Esterase Urine Moderate NEG^Negative A FrRdHs   Source Midstream Urine   FrRdHs   WBC Urine 169 0 - 5 /HPF H FrRdHs   RBC Urine >182 0 - 2 /HPF H FrRdHs   Bacteria Urine Few NEG^Negative /HPF A FrRdHs   Squamous Epithelial /HPF Urine 1 0 - 1 /HPF  FrRdHs   Mucous Urine Present NEG^Negative /LPF A FrRdHs            Testing Performed By     Lab - Abbreviation Name Director Address Valid Date Range    12 - FrRdHs [facility] Unknown [address] [facility] [id number] 05/08/15 1057 - Present    75 - Unknown [facility] Unknown [address]  [facility] [address] 01/15/15 1019 - Present    225 - Unknown [facility] Unknown [address] [facility] [address] 12/19/14 0954 - Present               Imaging Results - 3 Days      XR Tibia & Fibula Left 2 Views [[id number]]  Resulted: 06/28/18 1231, Result status: Final result    Ordering provider: Imelda Hunter PA-C  06/28/18 1002 Resulted by: Adam Thapa MD    Performed: 06/28/18 1059 - 06/28/18 1115 Resulting lab: RADIOLOGY RESULTS    Narrative:       XR TIBIA & FIBULA LT 2 VW 6/28/2018 11:15 AM    HISTORY: Pain.    COMPARISON: None.      Impression:       IMPRESSION: No evidence of acute fracture or malalignment.  Degenerative changes of the left knee.    ADAM THAPA MD      XR Ankle Left G/E 3 Views [[id number]]  Resulted: 06/28/18 1230, Result status: Final result    Ordering provider: Imelda Hunter PA-C  06/28/18 1002 Resulted by: Adam Thapa MD    Performed: 06/28/18 1059 - 06/28/18 1116 Resulting lab: RADIOLOGY RESULTS    Narrative:       XR ANKLE LT G/E 3 VW 6/28/2018 11:16 AM    HISTORY: Pain.    COMPARISON: July 30, 2010.      Impression:       IMPRESSION: Diffuse osteopenia. No definite radiographic evidence of  acute fracture or malalignment. Ankle mortise is intact. Calcaneal  bone spurs.    ADAM THAPA MD      XR Knee Left 3 Views [[id number]]  Resulted: 06/27/18 1602, Result status: Final result    Ordering provider: Ankur Bo MD  06/27/18 1510 Resulted by: Russel Ho MD    Performed: 06/27/18 1547 - 06/27/18 1558 Resulting lab: RADIOLOGY RESULTS    Narrative:       XR KNEE LT 3 VW 6/27/2018 3:58 PM    COMPARISON: None.    HISTORY: Trauma.      Impression:       IMPRESSION: Osteopenia about the left knee. There is a minimally  displaced proximal fibular fracture. No other fractures are suspected  in the left knee. Severe osteoarthritis with complete loss of joint  space in the lateral compartment as well as a small  knee effusion and  chondrocalcinosis in the medial compartment.    YAMILET KEARNS MD      Testing Performed By     Lab - Abbreviation Name Director Address Valid Date Range    104 - Rad Rslts RADIOLOGY RESULTS Unknown Unknown 02/16/05 1553 - Present            Encounter-Level Documents:     There are no encounter-level documents.      Order-Level Documents:     There are no order-level documents.

## 2018-06-27 NOTE — ED NOTES
Update given to obs. Floor charge RN about pt voiding, how urine looked, urine ordered and sent, how pt turned in bed and how she tolerated it.

## 2018-06-28 ENCOUNTER — APPOINTMENT (OUTPATIENT)
Dept: GENERAL RADIOLOGY | Facility: CLINIC | Age: 83
End: 2018-06-28
Attending: PHYSICIAN ASSISTANT
Payer: MEDICARE

## 2018-06-28 LAB
BACTERIA SPEC CULT: NO GROWTH
INTERPRETATION ECG - MUSE: NORMAL
Lab: NORMAL
SPECIMEN SOURCE: NORMAL

## 2018-06-28 PROCEDURE — A9270 NON-COVERED ITEM OR SERVICE: HCPCS | Mod: GY | Performed by: PHYSICIAN ASSISTANT

## 2018-06-28 PROCEDURE — 99225 ZZC SUBSEQUENT OBSERVATION CARE,LEVEL II: CPT | Performed by: PHYSICIAN ASSISTANT

## 2018-06-28 PROCEDURE — 73610 X-RAY EXAM OF ANKLE: CPT | Mod: LT

## 2018-06-28 PROCEDURE — 93010 ELECTROCARDIOGRAM REPORT: CPT | Performed by: INTERNAL MEDICINE

## 2018-06-28 PROCEDURE — 96374 THER/PROPH/DIAG INJ IV PUSH: CPT

## 2018-06-28 PROCEDURE — 40000275 ZZH STATISTIC RCP TIME EA 10 MIN

## 2018-06-28 PROCEDURE — 25000128 H RX IP 250 OP 636: Performed by: PHYSICIAN ASSISTANT

## 2018-06-28 PROCEDURE — 25000132 ZZH RX MED GY IP 250 OP 250 PS 637: Mod: GY | Performed by: PHYSICIAN ASSISTANT

## 2018-06-28 PROCEDURE — A9270 NON-COVERED ITEM OR SERVICE: HCPCS | Mod: GY | Performed by: INTERNAL MEDICINE

## 2018-06-28 PROCEDURE — 25000132 ZZH RX MED GY IP 250 OP 250 PS 637: Performed by: INTERNAL MEDICINE

## 2018-06-28 PROCEDURE — 73590 X-RAY EXAM OF LOWER LEG: CPT | Mod: LT

## 2018-06-28 PROCEDURE — 93005 ELECTROCARDIOGRAM TRACING: CPT

## 2018-06-28 PROCEDURE — G0378 HOSPITAL OBSERVATION PER HR: HCPCS

## 2018-06-28 RX ORDER — CEFTRIAXONE 1 G/1
1 INJECTION, POWDER, FOR SOLUTION INTRAMUSCULAR; INTRAVENOUS EVERY 24 HOURS
Status: DISCONTINUED | OUTPATIENT
Start: 2018-06-28 | End: 2018-06-29 | Stop reason: HOSPADM

## 2018-06-28 RX ORDER — ACETAMINOPHEN 325 MG/1
975 TABLET ORAL EVERY 8 HOURS
Status: DISCONTINUED | OUTPATIENT
Start: 2018-06-28 | End: 2018-06-29 | Stop reason: HOSPADM

## 2018-06-28 RX ADMIN — METOPROLOL TARTRATE 12.5 MG: 25 TABLET ORAL at 08:17

## 2018-06-28 RX ADMIN — GABAPENTIN 300 MG: 300 CAPSULE ORAL at 20:07

## 2018-06-28 RX ADMIN — CEFTRIAXONE SODIUM 1 G: 1 INJECTION, POWDER, FOR SOLUTION INTRAMUSCULAR; INTRAVENOUS at 10:39

## 2018-06-28 RX ADMIN — ACETAMINOPHEN 975 MG: 325 TABLET, FILM COATED ORAL at 20:06

## 2018-06-28 RX ADMIN — SIMVASTATIN 10 MG: 10 TABLET, FILM COATED ORAL at 20:07

## 2018-06-28 RX ADMIN — ACETAMINOPHEN 975 MG: 325 TABLET, FILM COATED ORAL at 12:11

## 2018-06-28 RX ADMIN — ASPIRIN 81 MG 81 MG: 81 TABLET ORAL at 08:17

## 2018-06-28 RX ADMIN — VITAMIN D, TAB 1000IU (100/BT) 2000 UNITS: 25 TAB at 08:17

## 2018-06-28 NOTE — PLAN OF CARE
Problem: Patient Care Overview  Goal: Plan of Care/Patient Progress Review  Problem: Patient Care Overview  Goal: Plan of Care/Patient Progress Review  PRIMARY DIAGNOSIS: ACUTE PAIN  OUTPATIENT/OBSERVATION GOALS TO BE MET BEFORE DISCHARGE:  1. Pain Status: Improved-controlled with oral pain medications.     2. Return to near baseline physical activity: bedrest     3. Cleared for discharge by consultants (if involved): No     Patient is alert and oriented, vitals are stable, patient stated her pain has improved after PRN tramadol.  Plan for ortho, PT, and social work consults.     Discharge Planner Nurse   Safe discharge environment identified: No  Barriers to discharge: Yes       Entered by: Brandon Lane 06/28/2018 12:17 AM  Please review provider order for any additional goals.   Nurse to notify provider when observation goals have been met and patient is ready for discharge.

## 2018-06-28 NOTE — CONSULTS
[facility] Orthopedic Consultation    Lali Santos MRN# [id number]   Age: 89 year old YOB: 1916     Date of Admission:  6/27/2018    Reason for consult: Left proximal fibula fracture        Requesting physician: Dr. Gardner       Level of consult: Consult, follow and place orders           Assessment and Plan:   Assessment:   1) Minimally displaced left proximal fibula fracture with left knee osteoarthritis          Plan:   1) I will discuss plan with Ortho Trauma Surgeon, Dr. Andersen when he is out of surgery but this fracture will most likely be managed non-operatively.   2) Further plan to follow.   3) spoke with Dr. Andersen- no additional fractures seen in left foot or left ankle.  4) No surgical intervention needed for proximal fibula fracture.   5) Patient can be WBAT on LLE- will order Orthotics Consult to fit with CAM boot to LLE for better support with pivot transfers from bed to wheelchair.   6) No Knee Immobilizer needed, ok to do gentle ROM at left knee as tolerated.   7) Patient should make follow up appointment with Dr. Andersen at [facility] in [place] in 3 weeks. Please call Dr. Andersen's care coordinator, Daphne, at [phone] to schedule this appointment.            Chief Complaint:   Left knee and leg pain          History of Present Illness:   This patient is a 89 year old female who presents with the following condition requiring a hospital admission:     Patient reports that she was in her assisted living apartment yesterday and a caregiver was helping her in the shower when she slipped and fell and was apparently lowered to the floor. She sustained a skin tear to her left lower leg. EMS was called and a dressing was applied to the skin tear but later on she began reporting increasing pain in her left knee and left leg. She was brought to ED for further evaluation. X-rays in ED revealed a minimally displaced fracture of the left proximal fibula along with  severe osteoarthritis and chondrocalcinosis but no other fractures about the knee. Patient is seen in her hospital room this morning. She reports 4/10 pain in left knee and just below left knee in region of proximal fibula at rest but this increases to 9/10 with movement of her left leg. She denies pain anywhere other than her left knee and leg. She denies any numbness or tingling in her BLE. She does state she has some baseline left foot drop. She is able to wiggle her toes, is able to plantar flex but has diminished dorsiflexion. She is unable to bend her left knee past 30 degrees due to pain. She does have some swelling about the left knee. She states that she is only able to transfer from bed to powerchair at baseline.           Past Medical History:     Past Medical History:   Diagnosis Date     DJD (degenerative joint disease)      Hypertension      Osteoporosis              Past Surgical History:     Past Surgical History:   Procedure Laterality Date     cardiac artery bypass       CHOLECYSTECTOMY               Social History:     Social History   Substance Use Topics     Smoking status: Not on file     Smokeless tobacco: Not on file     Alcohol use Not on file             Family History:   No family history on file.          Immunizations:     VACCINE/DOSE   Diptheria   DPT   DTAP   HBIG   Hepatitis A   Hepatitis B   HIB   Influenza   Measles   Meningococcal   MMR   Mumps   Pneumococcal   Polio   Rubella   Small Pox   TDAP   Varicella   Zoster             Allergies:   No Known Allergies          Medications:     Current Facility-Administered Medications   Medication     acetaminophen (TYLENOL) Suppository 650 mg     acetaminophen (TYLENOL) tablet 650 mg     aspirin chewable tablet 81 mg     bisacodyl (DULCOLAX) Suppository 10 mg     cefTRIAXone (ROCEPHIN) 1 g vial to attach to  mL bag for ADULTS or NS 50 mL bag for PEDS     cholecalciferol (vitamin D3) tablet 2,000 Units     gabapentin (NEURONTIN)  capsule 300 mg     melatonin tablet 1 mg     metoprolol tartrate (LOPRESSOR) half-tab 12.5 mg     naloxone (NARCAN) injection 0.1-0.4 mg     ondansetron (ZOFRAN-ODT) ODT tab 4 mg    Or     ondansetron (ZOFRAN) injection 4 mg     polyethylene glycol (MIRALAX/GLYCOLAX) Packet 17 g     prochlorperazine (COMPAZINE) injection 5 mg    Or     prochlorperazine (COMPAZINE) tablet 5 mg    Or     prochlorperazine (COMPAZINE) Suppository 12.5 mg     senna-docusate (SENOKOT-S;PERICOLACE) 8.6-50 MG per tablet 1 tablet    Or     senna-docusate (SENOKOT-S;PERICOLACE) 8.6-50 MG per tablet 2 tablet     simvastatin (ZOCOR) tablet 10 mg     traMADol (ULTRAM) half-tab 25 mg             Review of Systems:   CV: NEGATIVE for chest pain, palpitations or peripheral edema  C: NEGATIVE for fever, chills, change in weight  E/M: NEGATIVE for ear, mouth and throat problems  R: NEGATIVE for significant cough or SOB          Physical Exam:   All vitals have been reviewed  Patient Vitals for the past 24 hrs:   BP Temp Temp src Pulse Heart Rate Resp SpO2   06/28/18 0750 - - - - - - 93 %   06/28/18 0741 125/67 98.5  F (36.9  C) Oral - 97 14 (!) 88 %   06/28/18 0421 103/66 97.5  F (36.4  C) Oral - 78 14 91 %   06/27/18 2340 118/89 98  F (36.7  C) Oral - 88 14 93 %   06/27/18 2040 96/79 98.2  F (36.8  C) Oral - 54 12 90 %   06/27/18 1530 121/79 - - - - - 91 %   06/27/18 1508 124/80 97.7  F (36.5  C) Temporal 89 89 15 92 %     No intake or output data in the 24 hours ending 06/28/18 0859  Musculoskeletal:   LLE: In Knee Immobilizer. Moderate swelling about the left knee, minor skin abrasions over patella. Skin tear over distal anterior shin with some bleeding- covered with dressing.   +TTP along proximal left fibula and lateral left knee  Neg TTP to left hip or left ankle  - Only able to tolerate about 30 degrees flexion at left knee d/t pain, unable to complete SLR   - Baseline left foot drop otherwise distal CMS intact  - +PF/EHL, diminished  dorsiflexion  - 1+ DP pulse, cap refill < 3 sec              Data:   All laboratory data reviewed  Results for orders placed or performed during the hospital encounter of 06/27/18   XR Knee Left 3 Views    Narrative    XR KNEE LT 3 VW 6/27/2018 3:58 PM    COMPARISON: None.    HISTORY: Trauma.      Impression    IMPRESSION: Osteopenia about the left knee. There is a minimally  displaced proximal fibular fracture. No other fractures are suspected  in the left knee. Severe osteoarthritis with complete loss of joint  space in the lateral compartment as well as a small knee effusion and  chondrocalcinosis in the medial compartment.    YAMILET KEARNS MD   UA with Microscopic reflex to Culture   Result Value Ref Range    Color Urine [place]     Appearance Urine Cloudy     Glucose Urine Negative NEG^Negative mg/dL    Bilirubin Urine Negative NEG^Negative    Ketones Urine Negative NEG^Negative mg/dL    Specific Gravity Urine 1.016 1.003 - 1.035    Blood Urine Large (A) NEG^Negative    pH Urine 5.0 5.0 - 7.0 pH    Protein Albumin Urine 100 (A) NEG^Negative mg/dL    Urobilinogen mg/dL 2.0 0.0 - 2.0 mg/dL    Nitrite Urine Negative NEG^Negative    Leukocyte Esterase Urine Moderate (A) NEG^Negative    Source Midstream Urine     WBC Urine 169 (H) 0 - 5 /HPF    RBC Urine >182 (H) 0 - 2 /HPF    Bacteria Urine Few (A) NEG^Negative /HPF    Squamous Epithelial /HPF Urine 1 0 - 1 /HPF    Mucous Urine Present (A) NEG^Negative /LPF   Basic metabolic panel   Result Value Ref Range    Sodium 137 133 - 144 mmol/L    Potassium 4.3 3.4 - 5.3 mmol/L    Chloride 106 94 - 109 mmol/L    Carbon Dioxide 25 20 - 32 mmol/L    Anion Gap 6 3 - 14 mmol/L    Glucose 133 (H) 70 - 99 mg/dL    Urea Nitrogen 23 7 - 30 mg/dL    Creatinine 0.93 0.52 - 1.04 mg/dL    GFR Estimate 55 (L) >60 mL/min/1.7m2    GFR Estimate If Black 67 >60 mL/min/1.7m2    Calcium 8.4 (L) 8.5 - 10.1 mg/dL   CBC with platelets   Result Value Ref Range    WBC 9.6 4.0 - 11.0 10e9/L    RBC  Count 4.36 3.8 - 5.2 10e12/L    Hemoglobin 13.7 11.7 - 15.7 g/dL    Hematocrit 43.3 35.0 - 47.0 %    MCV 99 78 - 100 fl    MCH 31.4 26.5 - 33.0 pg    MCHC 31.6 31.5 - 36.5 g/dL    RDW 13.7 10.0 - 15.0 %    Platelet Count 171 150 - 450 10e9/L   Urine Culture Aerobic Bacterial   Result Value Ref Range    Specimen Description Midstream Urine     Special Requests Specimen received in preservative     Culture Micro PENDING           Attestation:  I have reviewed today's vital signs, notes, medications, labs and imaging with Dr. Anderesn.  Amount of time performed on this consult: 35 minutes.    LUCI Anaya CNP

## 2018-06-28 NOTE — PLAN OF CARE
Problem: Patient Care Overview  Goal: Plan of Care/Patient Progress Review  Outcome: No Change  PRIMARY DIAGNOSIS: ACUTE PAIN  OUTPATIENT/OBSERVATION GOALS TO BE MET BEFORE DISCHARGE:  1. Pain Status: Improved-controlled with oral pain medications.    2. Return to near baseline physical activity: No, bedrest until ortho consult    3. Cleared for discharge by consultants (if involved): No, ortho, PT, and social work consults    Discharge Planner Nurse   Safe discharge environment identified: No  Barriers to discharge: Yes       Entered by: Mela Orellana 06/28/2018 8:00a.m.     Please review provider order for any additional goals.   Nurse to notify provider when observation goals have been met and patient is ready for discharge.    AOx3.  Denies pain this am.  L fibular fx, immobilizer in place.  Bedrest until ortho consult today. Swelling in L knee, ankle, and foot. CMS intact. Skin tear on L shin from fall, dressing has some serosanguinous drainage on it, will change dressing.  UA positive, provider notified. UC pending.  EKG ordered for irregular heart rhythm on assessment. VSS. Pt continent of B&B throughout shift, using bedpan.  Plan for ortho, PT, and social work consults today. Will continue to monitor and provide supportive cares.

## 2018-06-28 NOTE — H&P
[facility]  Hospitalist H&P    Name: Lali Satnos      MRN: [id number]  YOB: 1916    Age: 89 year old  Date of admission: 6/27/2018  Primary care provider: Shiva Garcia            Assessment and Plan:   Lali Santos is a 89 year old female with a history of hypertension and coronary artery disease who presents with fall and left fibula fracture.    1.  Left fibula fracture.  Pain medications as needed.  Orthopedic surgery consult.  Physical therapy consult.    2.  Hypertension.  Restart metoprolol.    3.  Hyperlipidemia.  Restart simvastatin.    4.  Coronary artery disease.  Restart simvastatin, aspirin, and metoprolol.    Admit to observation.  Prophylaxis: Pneumatic compression devices.              Chief Complaint:   Left leg pain.         History of Present Illness:   Lali Santos is a 89 year old female who presents with left leg pain after a fall.  Had a fall in the shower today.  Having left leg pain after the fall.  Her mobility is usually limited to being able to assist with pivoting.  Did not try any pain medications before arriving in the emergency room.  Pain medications given in the emergency room do seem to have helped.  Otherwise has no complaints at this time.            Past Medical History:     Past Medical History:   Diagnosis Date     DJD (degenerative joint disease)      Hypertension      Osteoporosis              Past Surgical History:     Past Surgical History:   Procedure Laterality Date     cardiac artery bypass       CHOLECYSTECTOMY               Social History:     Social History   Substance Use Topics     Smoking status: Not on file     Smokeless tobacco: Not on file     Alcohol use Not on file             Family History:   Mother, father, and 2 sisters with strokes.         Allergies:   No Known Allergies          Medications:     Prior to Admission medications    Medication Sig Last Dose Taking? Auth Provider   aspirin 81 MG chewable  tablet Take 81 mg by mouth daily 6/27/2018 at Unknown time Yes Unknown, Entered By History   cholecalciferol 2000 UNITS CAPS Take 1 capsule by mouth daily 6/27/2018 at Unknown time Yes Unknown, Entered By History   metoprolol (LOPRESSOR) 25 MG tablet Take 12.5 mg by mouth every morning  6/27/2018 at Unknown time Yes Unknown, Entered By History   multivitamin (OCUVITE) TABS Take 1 tablet by mouth daily 6/27/2018 at Unknown time Yes Unknown, Entered By History   simvastatin (ZOCOR) 10 MG tablet Take 10 mg by mouth every evening 6/26/2018 at Unknown time Yes Unknown, Entered By History             Review of Systems:   A Comprehensive greater than 10 system review of systems was carried out.  Pertinent positives and negatives are noted above.  Otherwise negative for contributory information.           Physical Exam:   Blood pressure 121/79, pulse 89, temperature 97.7  F (36.5  C), temperature source Temporal, resp. rate 15, SpO2 91 %.  Wt Readings from Last 1 Encounters:   10/12/15 66.8 kg (147 lb 4.8 oz)     Exam:  GENERAL: No apparent distress. Awake, alert, and fully oriented.  HEENT: Normocephalic, atraumatic. Extraocular movements intact.  CARDIOVASCULAR: Regular rate and rhythm without murmurs or rubs. No S3.  PULMONARY: Clear to auscultation bilaterally.  ABDOMINAL: Soft, non-tender, non-distended. Bowel sounds normoactive.   EXTREMITIES: No cyanosis or clubbing. No appreciable edema.  NEUROLOGICAL: CN 2-12 grossly intact, no focal neurological deficits.  DERMATOLOGICAL: No rash, ulcer, bruising, nor jaundice.          Data:       Laboratory:  No results for input(s): WBC, HGB, HCT, MCV, PLT in the last 168 hours.  No results for input(s): NA, POTASSIUM, CHLORIDE, CO2, ANIONGAP, GLC, BUN, CR, GFRESTIMATED, GFRESTBLACK, ANGELINA in the last 168 hours.  No results for input(s): CULT in the last 168 hours.    Imaging:  Recent Results (from the past 24 hour(s))   XR Knee Left 3 Views    Narrative    XR KNEE LT 3 VW  6/27/2018 3:58 PM    COMPARISON: None.    HISTORY: Trauma.      Impression    IMPRESSION: Osteopenia about the left knee. There is a minimally  displaced proximal fibular fracture. No other fractures are suspected  in the left knee. Severe osteoarthritis with complete loss of joint  space in the lateral compartment as well as a small knee effusion and  chondrocalcinosis in the medial compartment.    YAMILET KEARNS MD

## 2018-06-28 NOTE — PLAN OF CARE
"Problem: Patient Care Overview  Goal: Plan of Care/Patient Progress Review  Outcome: No Change  PRIMARY DIAGNOSIS: ACUTE PAIN/ L fibular fx.  OUTPATIENT/OBSERVATION GOALS TO BE MET BEFORE DISCHARGE:  1. Pain Status: Improved-controlled with oral pain medications.      2. Return to near baseline physical activity: No-awaiting CAM boot      3. Cleared for discharge by consultants (if involved): No, PTand social work consults      Discharge Planner Nurse   Safe discharge environment identified: No  Barriers to discharge: Yes         Please review provider order for any additional goals.   Nurse to notify provider when observation goals have been met and patient is ready for discharge.   BP 92/50 (BP Location: Right arm)  Pulse 62  Temp 97.5  F (36.4  C) (Oral)  Resp 16  Ht 1.6 m (5' 3\")  Wt 64.1 kg (141 lb 4.8 oz)  SpO2 93%  BMI 25.03 kg/m2    AOx3 (disoriented to time/date). Per tele tech, rate controlled A fib, rate 60's-80's.  Denies pain at rest, reports pain with any movement of LLE. Getting schduled Tylenol and PRN tramadol available.  UC negative- IV Rocephin ordered and administered for possible UTI-provider to f/u with UC in am.    Dressing to LLE skin tear C/D/I-was changed earlier today.  Voiding on bedpan. Repositioning frequently.  Plan is for pt to get up with PT tomorrow (once CAM boot delivered) then can be WBAT on LLE. Will continue to monitor and provide supportive cares.      "

## 2018-06-28 NOTE — PLAN OF CARE
Problem: Patient Care Overview  Goal: Plan of Care/Patient Progress Review  PT: Awaiting ortho orders. Hold at this time.     PT: Noted WBAT in CAM boot. CAM boot not available at this time.

## 2018-06-28 NOTE — PROGRESS NOTES
[facility]  Observation Unit  Progress Note    Date of Service: 6/28/2018    Patient: Lali Santos  MRN: [id number]  Admission Date: 6/27/2018  Hospital Day # 1    Assessment & Plan: Lali Santos is a 89 year old female who presented from her care facility after a witnessed fall with left leg pain and found to have a left fibula fracture.     Acute Left Fibula Fracture - patient suffered a minor fall while in the shower with a helper on 6/27.  The patient was slowly lowered/slid down to the floor and suffered a skin tear to the left leg and left leg pain.  No LOC or head injury reported.  Left knee xray with a minimally displaced proximal fibular fracture with severe OA and small knee effusion.  - Orthopedic Surgery consulted and recommend left tib/fib and left ankle xrays.  Still deciding on operative vs non operative management.    - PT consult  - SW consult  - start scheduled Tylenol with prn Ultram    Abnormal UA - denies irritative voiding symptoms, afebrile with normal wbc.    - will give a dose of IV Ceftriaxone for possible UTI and follow up on urine culture.    Hx CAD - s/p 3 vessel CABG in 1982.  No chest pain.  Continue home ASA and Simvastatin.    HTN - stable.  Continue home Lopressor.    Paroxysmal vs Chronic Atrial Fibrillation - irregular heart beat noted on my exam this morning prompting an EKG which reveals Atrial fibrillation with RBBB and Left Anterior Fascicular Block.  Chart review performed revealing a hx of paroxysmal afib, not currently on anticoagulation due to her advanced age and high fall risk.  - monitor on telemetry  - continue home Lopressor and ASA    # Pain Assessment:  Current Pain Score 6/28/2018   Patient currently in pain? denies   Pain score (0-10) -   Pain location -   Pain descriptors -   - Lali is experiencing pain due to left fibula fracture. Pain management was discussed and the plan was created in a collaborative fashion.  Lali's response to the  "current recommendations: engaged  - start scheduled Tylenol.  Prn Ultram    CODE: DNR/DNI; confirmed with daughter at the bedside  DVT: scd  Diet/fluids: npo for now until surgery evaluation complete  Disposition: will likely need TCU, SW consulted.    Imelda Hunter MS, PA-C  Hospitalist Physician Assistant  [facility]  Pager: [phone]      Subjective & Interval Hx:    Patient reports mild pain in her LLE worse with any movement.  Denies numbness or tingling.  No chest pain, shortness of breath, dysuria or abdominal pain.  Remembers the fall in the shower and denies LOC or any preceding lightheadedness or chest pain.     Last 24 hr care team notes reviewed.   ROS:  4 point ROS including Respiratory, CV, GI and , other than that noted in the HPI, is negative.    Physical Exam:    Blood pressure 125/67, pulse 89, temperature 98.5  F (36.9  C), temperature source Oral, resp. rate 14, height 1.6 m (5' 3\"), weight 64.1 kg (141 lb 4.8 oz), SpO2 93 %.  General: Alert, interactive, NAD, lying in bed, pleasant  HEENT: AT/NC, sclera anicteric, PERRL  Resp: clear to auscultation bilaterally, no crackles or wheezes  Cardiac: regular rate and rhythm, no murmur  Abdomen: Soft, nontender, nondistended. +BS.   Extremities: LLE in immobilizer with guaze wrap underneath.  Skin: Warm and dry, abrasion and ecchymosis to the left knee with mild swelling.  Left anterior shin with bruising, ecchymosis and superficial skin tears.    Neuro: Alert & oriented able to recall events from yesterday.  FROM testing not performed.  Known chronic left foot drop per family.    Labs & Images:  Reviewed in Epic   Medications:    Current Facility-Administered Medications   Medication     acetaminophen (TYLENOL) Suppository 650 mg     acetaminophen (TYLENOL) tablet 650 mg     aspirin chewable tablet 81 mg     bisacodyl (DULCOLAX) Suppository 10 mg     cefTRIAXone (ROCEPHIN) 1 g vial to attach to  mL bag for ADULTS or NS 50 mL " bag for PEDS     cholecalciferol (vitamin D3) tablet 2,000 Units     gabapentin (NEURONTIN) capsule 300 mg     melatonin tablet 1 mg     metoprolol tartrate (LOPRESSOR) half-tab 12.5 mg     naloxone (NARCAN) injection 0.1-0.4 mg     ondansetron (ZOFRAN-ODT) ODT tab 4 mg    Or     ondansetron (ZOFRAN) injection 4 mg     polyethylene glycol (MIRALAX/GLYCOLAX) Packet 17 g     prochlorperazine (COMPAZINE) injection 5 mg    Or     prochlorperazine (COMPAZINE) tablet 5 mg    Or     prochlorperazine (COMPAZINE) Suppository 12.5 mg     senna-docusate (SENOKOT-S;PERICOLACE) 8.6-50 MG per tablet 1 tablet    Or     senna-docusate (SENOKOT-S;PERICOLACE) 8.6-50 MG per tablet 2 tablet     simvastatin (ZOCOR) tablet 10 mg     traMADol (ULTRAM) half-tab 25 mg

## 2018-06-28 NOTE — PLAN OF CARE
Problem: Patient Care Overview  Goal: Plan of Care/Patient Progress Review  Outcome: No Change  Problem: Patient Care Overview  Goal: Plan of Care/Patient Progress Review  Outcome: No Change  PRIMARY DIAGNOSIS: ACUTE PAIN/ L fibular fx.  OUTPATIENT/OBSERVATION GOALS TO BE MET BEFORE DISCHARGE:  1. Pain Status: Improved-controlled with oral pain medications.     2. Return to near baseline physical activity: No, bedrest until ortho consult     3. Cleared for discharge by consultants (if involved): No, ortho, PT, and social work consults     Discharge Planner Nurse   Safe discharge environment identified: No  Barriers to discharge: Yes       Entered by: Mela Orellana 06/28/2018 12:00p.m.  Please review provider order for any additional goals.   Nurse to notify provider when observation goals have been met and patient is ready for discharge.     AOx3. Pain controled with Tylenol and PRN tramadol. Remains NPO in case of surgery, awaiting orders from ortho. 22g IV placed in R antecubital. IV Rocephin ordered and administered for pos. UA, UC negative, provider aware and wants to continue to watch UC and possibly DC Rocephin in am.  Dressing to LLE skin tear changed and area cleansed with NS.  EKG showed A-fib, provider aware, appears chronic per pt's chart.  Pt remains NPO pending ortho orders. VSS. Daughter at bedside. PT to see and social work consult in.  Will continue to monitor and provide supportive cares.

## 2018-06-28 NOTE — CONSULTS
.Care Transition Initial Assessment -   Reason For Consult: discharge planning. Per consult information it was noted that per daughter pt would be unable to return to her current residence at the [facility] due to her injury. Noted PT assessment pending.     Active Problems:    Left fibular fracture       DATA  Lives With: alone  Living Arrangements: apartment  Description of Support System: Supportive, Involved  Who is your support system?: Children  Support Assessment: Adequate family and caregiver support.       Resources List: Skilled Nursing Facility     Quality Of Family Relationships: supportive, involved      INTERVENTION     Spoke this afternoon to pt's daughter, Sue who affirmed consideration of pt's transfer to rehab facility, noting also that they will want to wait until she has PT assessment to make the determination. She notes also that she has been in contact today with staff at [facility] informing of pt's hospitalization and possible need for her transfer to rehab facility. Daughter also acknowledges understanding of the current observation care status and that pt's rehab stay would be private pay as she does not tehn meet Medicare/SNF criteria, she also acknowledges understanding of the $5000 upfront cost. Based on discussion, referral has been made to [facility] should rehab stay need be determined per therapy recommendation... Daughter notes that pt does not ambulate ( for years) she has been able to self-transfer from lift chair to power chair which she uses within her apartment, per daughter no current home care services for pt.            ASSESSMENT  Cognitive Status:  alert     PLAN  Financial costs for the patient includes: private pay cost for rehab facility due to observation status.  Patient given options and choices for discharge: Yes     Will await PT assessment and recommendation, will assess further with pt/daughter tomorrow regarding pt's home management in  connection with discharge recommendation.

## 2018-06-28 NOTE — PLAN OF CARE
Problem: Patient Care Overview  Goal: Plan of Care/Patient Progress Review  PRIMARY DIAGNOSIS: ACUTE PAIN  OUTPATIENT/OBSERVATION GOALS TO BE MET BEFORE DISCHARGE:  1. Pain Status: Improved-controlled with oral pain medications.    2. Return to near baseline physical activity: bedrest    3. Cleared for discharge by consultants (if involved): No    Patient is alert and oriented, vitals are stable, patient stated when her leg is moved her pain is a 10.  Gave her 25mg of PO tramadol.  Plan for ortho, PT, and social work consults.    Discharge Planner Nurse   Safe discharge environment identified: No  Barriers to discharge: Yes       Entered by: Brandon Lane 06/28/2018 12:17 AM     Please review provider order for any additional goals.   Nurse to notify provider when observation goals have been met and patient is ready for discharge.

## 2018-06-29 ENCOUNTER — APPOINTMENT (OUTPATIENT)
Dept: PHYSICAL THERAPY | Facility: CLINIC | Age: 83
End: 2018-06-29
Payer: MEDICARE

## 2018-06-29 VITALS
DIASTOLIC BLOOD PRESSURE: 87 MMHG | WEIGHT: 141.3 LBS | OXYGEN SATURATION: 93 % | HEIGHT: 63 IN | TEMPERATURE: 97.7 F | SYSTOLIC BLOOD PRESSURE: 105 MMHG | HEART RATE: 85 BPM | RESPIRATION RATE: 16 BRPM | BODY MASS INDEX: 25.04 KG/M2

## 2018-06-29 PROCEDURE — 25000128 H RX IP 250 OP 636: Performed by: PHYSICIAN ASSISTANT

## 2018-06-29 PROCEDURE — G0378 HOSPITAL OBSERVATION PER HR: HCPCS

## 2018-06-29 PROCEDURE — 97530 THERAPEUTIC ACTIVITIES: CPT | Mod: GP | Performed by: PHYSICAL THERAPIST

## 2018-06-29 PROCEDURE — 40000193 ZZH STATISTIC PT WARD VISIT: Performed by: PHYSICAL THERAPIST

## 2018-06-29 PROCEDURE — 25000132 ZZH RX MED GY IP 250 OP 250 PS 637: Mod: GY | Performed by: PHYSICIAN ASSISTANT

## 2018-06-29 PROCEDURE — A9270 NON-COVERED ITEM OR SERVICE: HCPCS | Mod: GY | Performed by: INTERNAL MEDICINE

## 2018-06-29 PROCEDURE — 99217 ZZC OBSERVATION CARE DISCHARGE: CPT | Performed by: PHYSICIAN ASSISTANT

## 2018-06-29 PROCEDURE — 25000132 ZZH RX MED GY IP 250 OP 250 PS 637: Mod: GY | Performed by: INTERNAL MEDICINE

## 2018-06-29 PROCEDURE — A9270 NON-COVERED ITEM OR SERVICE: HCPCS | Mod: GY | Performed by: PHYSICIAN ASSISTANT

## 2018-06-29 PROCEDURE — 96376 TX/PRO/DX INJ SAME DRUG ADON: CPT

## 2018-06-29 PROCEDURE — 97161 PT EVAL LOW COMPLEX 20 MIN: CPT | Mod: GP | Performed by: PHYSICAL THERAPIST

## 2018-06-29 RX ORDER — ACETAMINOPHEN 500 MG
1000 TABLET ORAL 3 TIMES DAILY
DISCHARGE
Start: 2018-06-29

## 2018-06-29 RX ORDER — TRAMADOL HYDROCHLORIDE 50 MG/1
25 TABLET ORAL EVERY 6 HOURS PRN
Qty: 10 TABLET | Refills: 0 | Status: SHIPPED | OUTPATIENT
Start: 2018-06-29

## 2018-06-29 RX ORDER — POLYETHYLENE GLYCOL 3350 17 G/17G
17 POWDER, FOR SOLUTION ORAL DAILY PRN
Qty: 7 PACKET | DISCHARGE
Start: 2018-06-29

## 2018-06-29 RX ADMIN — CEFTRIAXONE SODIUM 1 G: 1 INJECTION, POWDER, FOR SOLUTION INTRAMUSCULAR; INTRAVENOUS at 09:47

## 2018-06-29 RX ADMIN — TRAMADOL HYDROCHLORIDE 25 MG: 50 TABLET ORAL at 16:46

## 2018-06-29 RX ADMIN — ACETAMINOPHEN 975 MG: 325 TABLET, FILM COATED ORAL at 12:15

## 2018-06-29 RX ADMIN — VITAMIN D, TAB 1000IU (100/BT) 2000 UNITS: 25 TAB at 09:47

## 2018-06-29 RX ADMIN — ACETAMINOPHEN 975 MG: 325 TABLET, FILM COATED ORAL at 03:33

## 2018-06-29 RX ADMIN — METOPROLOL TARTRATE 12.5 MG: 25 TABLET ORAL at 09:49

## 2018-06-29 RX ADMIN — ASPIRIN 81 MG 81 MG: 81 TABLET ORAL at 09:46

## 2018-06-29 NOTE — PLAN OF CARE
Problem: Patient Care Overview  Goal: Plan of Care/Patient Progress Review  Outcome: No Change  PRIMARY DIAGNOSIS: ACUTE PAIN  OUTPATIENT/OBSERVATION GOALS TO BE MET BEFORE DISCHARGE:  1. Pain Status: Improved-controlled with oral pain medications.    2. Return to near baseline physical activity: No    3. Cleared for discharge by consultants (if involved): N/A    Discharge Planner Nurse   Safe discharge environment identified: No  Barriers to discharge: No       Entered by: Josie Bonilla 06/29/2018 4:59 PM     Please review provider order for any additional goals.   Nurse to notify provider when observation goals have been met and patient is ready for discharge.    VSS, Alert, disoriented to time. Assist x2 with edgar levi, PT assessed pt and recommending TCU. SW following for placement. Pt on scheduled tylenol.  Tramadol prn.  Denies pain when laying in bed, very painful when bearing weight.  Cam boot on.

## 2018-06-29 NOTE — PLAN OF CARE
Problem: Patient Care Overview  Goal: Plan of Care/Patient Progress Review  Outcome: Adequate for Discharge Date Met: 06/29/18  Patient's After Visit Summary was reviewed with patient and daughter.   Patient verbalized understanding of After Visit Summary, recommended follow up and was given an opportunity to ask questions.   Discharge medications sent home with patient/family: Not applicable, transferred to TCU    Discharged with transport tech    OBSERVATION patient END time: 1705

## 2018-06-29 NOTE — PROGRESS NOTES
SWS     D: Discharge planning continuing... noted PT assessment and recommendation for pt's transfer to rehab facility discussed with MD who informs of planning for pt's discharge today. [facility] has assessed and will be able to accept pt today, semi-private room no roommate at this time.      I: Met with pt, son and daughter also present in room. Discussed above, they have asked that planning continue for pt's transfer to [facility], medical transport requested. Discussed with them that Medicare does not cover w/c transport, discussed cost of $70/base and $4.50/mi which they have accepted. Discussed also determining factors or rehab facility length of stay and addressed additional questions.         Per conversation today it was noted that family monitors pt's care needs on a daily basis with usually daily visits as son lives nearby pt. They have been setting up pt's meds for a two week period of time, pt takes then on a daily basis.. Both son and daughter have noted that they are finding that pt is not taking all of her pills daily so will be looking at this more closely when she returns back home again. Pt receives assist for bathing, she has housekeeping support, and uses Meals on Wheels, or eats her meals in [facility] [place] [facility] she resides. Daughter notes that they have awareness of additional services that would be available to pt at her residence.        As pt does not meet Medicare/SNF criteria, family will make payment arrangements with facility.     A/P: Good family support, anticipate no problem with arrangements made for transfer today, SW available until discharge should adjustments be needed in plan as noted.        .PAS-RR    D: Per DHS regulation, RIOS completed and submitted PAS-RR to [place] [employer] Direct [employer] via the [employer].  PAS-RR confirmation # is : [id number]

## 2018-06-29 NOTE — PLAN OF CARE
Problem: Patient Care Overview  Goal: Plan of Care/Patient Progress Review  Problem: Patient Care Overview  Goal: Plan of Care/Patient Progress Review  PRIMARY DIAGNOSIS: ACUTE PAIN  OUTPATIENT/OBSERVATION GOALS TO BE MET BEFORE DISCHARGE:  1. Pain Status: Improved-controlled with oral pain medications.     2. Return to near baseline physical activity: No     3. Cleared for discharge by consultants (if involved): No     Patients vitals are stable, pain is controlled with oral tylenol.  Patient is A-fib/flutter, heart rate in 90s per tele tech.  Patient is weight bearing as tolerated with CAM boot though not assessed by PT yet.  Dressing to the left shin is clean dry and intact.  Patient may need TCU before returning to home.  Social work is following for placement.     Discharge Planner Nurse   Safe discharge environment identified: No  Barriers to discharge: Yes       Entered by: Brandon Lane 06/29/2018 12:53 AM      Please review provider order for any additional goals.   Nurse to notify provider when observation goals have been met and patient is ready for discharge.

## 2018-06-29 NOTE — PROGRESS NOTES
06/29/18 1101   Quick Adds   Type of Visit Initial PT Evaluation   Living Environment   Lives With alone   Living Arrangements apartment  ([place])   Home Accessibility no concerns   Transportation Available car;family or friend will provide   Living Environment Comment Pt lives in apartment   Self-Care   Usual Activity Tolerance fair   Current Activity Tolerance poor   Regular Exercise no   Equipment Currently Used at Home wheelchair, power   Activity/Exercise/Self-Care Comment Pt transfers from bed to W/C and W/C to toilet on her own without use of FWW or AD.   Functional Level Prior   Ambulation 4-->completely dependent   Transferring 0-->independent   Toileting 1-->assistive equipment  (grab)   Bathing 2-->assistive person   Dressing 0-->independent   Eating 0-->independent   Communication 0-->understands/communicates without difficulty   Swallowing 0-->swallows foods/liquids without difficulty   Cognition 1 - attention or memory deficits   Fall history within last six months yes   Number of times patient has fallen within last six months 4   Prior Functional Level Comment Pt recives 2 meals per day, and meals on wheels. Delivery to her room.   General Information   Onset of Illness/Injury or Date of Surgery - Date 06/28/18   Referring Physician Jj   Patient/Family Goals Statement Pt is hoping to TCU.    Pertinent History of Current Problem (include personal factors and/or comorbidities that impact the POC) Lali Santos is a 89 year old female who presented from her care facility after a witnessed fall with left leg pain and found to have a left fibula fracture. 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   Precautions/Limitations fall precautions   Weight-Bearing Status - LLE weight-bearing as tolerated   Cognitive Status Examination   Orientation person;place   Level of Consciousness alert   Follows Commands and Answers Questions 100% of the time   Personal Safety and Judgment impaired  "  Memory impaired   Pain Assessment   Patient Currently in Pain Yes, see Vital Sign flowsheet   Integumentary/Edema   Integumentary/Edema Comments knee joint effusion, skin tear on shin(currently covered)   Posture    Posture Forward head position;Protracted shoulders   Range of Motion (ROM)   ROM Comment painful with attempts to knee flexion past 90 degrees   Strength   Strength Comments 3-/5 for hip flexion, 3/5 quads   Bed Mobility   Bed Mobility Comments min/mod A   Transfer Skills   Transfer Comments min/mod A X 2 with Nadine Steady   Gait   Gait Comments unable   General Therapy Interventions   Planned Therapy Interventions orthotic fitting/training;bed mobility training;transfer training;progressive activity/exercise;risk factor education   Clinical Impression   Criteria for Skilled Therapeutic Intervention yes, treatment indicated   PT Diagnosis decreased functional mobility status post L fibular fracture   Influenced by the following impairments pain, decreased ROM, decreased strength   Functional limitations due to impairments decreased bed mob, transfers   Clinical Presentation Stable/Uncomplicated   Clinical Presentation Rationale improving upon admit   Clinical Decision Making (Complexity) Low complexity   Therapy Frequency` daily   Predicted Duration of Therapy Intervention (days/wks) 1 day for treat and recs   Anticipated Discharge Disposition Transitional Care Facility   Risk & Benefits of therapy have been explained Yes   Patient, Family & other staff in agreement with plan of care Yes   [facility] [employer] TM \"6 Clicks\"   2016, Trustees of [facility], under license to [employer].  All rights reserved.   6 Clicks Short Forms Basic Mobility Inpatient Short Form   [facility] [employer]  \"6 Clicks\" V.2 Basic Mobility Inpatient Short Form   1. Turning from your back to your side while in a flat bed without using bedrails? 2 - A Lot   2. Moving from lying on your back to sitting on the " side of a flat bed without using bedrails? 2 - A Lot   3. Moving to and from a bed to a chair (including a wheelchair)? 2 - A Lot   4. Standing up from a chair using your arms (e.g., wheelchair, or bedside chair)? 2 - A Lot   5. To walk in hospital room? 1 - Total   6. Climbing 3-5 steps with a railing? 1 - Total   Basic Mobility Raw Score (Score out of 24.Lower scores equate to lower levels of function) 10   Total Evaluation Time   Total Evaluation Time (Minutes) 10

## 2018-06-29 NOTE — DISCHARGE SUMMARY
[facility] / Observation Unit  Discharge Summary        Lali Santos MRN# [id number]   YOB: 1916 Age: 89 year old     Date of Admission:  6/27/2018  Date of Discharge:  6/29/2018  Admitting Physician:  Ron Gardner DO  Discharge Physician: Lali Rousseau PA-C  Discharging Service: Hospitalist      Primary Provider: Shiva Garcia  Primary Care Physician Phone Number: [phone]         Primary Discharge Diagnoses:    Lali Santos was admitted on 6/27/2018 for concerns of fall and left proximal fibular fx.     1. Left proximal fibular fx - fell in shower, ortho consulted, recommended conservative management with a walking boot. May weight bear as tolerated. If boot is intolerable, may use AFO or go without and weight bear as tolerated. PT consulted, recommended TCU. Pt will discharge to [facility] TCU.  2. Fall - witnessed, mechanical fall while in shower. Was helped to the floor. Primary power chair bound at baseline, is able to stand and pivot transfer  3. Abnormal UA - urine culture is negative, no further abx.  4. CAD - s/p 3v CABGin 1982. Denies chest pain. Continue home meds  5. HTN - stable, resume lopressor.   6. A fib - not anticoagulated due to advanced age and high fall risk        Secondary Discharge Diagnoses:     Past Medical History:   Diagnosis Date     Atrial fibrillation (H)     Paroxysmal vs Chronic Atrial Fibrillation      CAD (coronary artery disease)     s/p 3 vessel CABG in 1982     DJD (degenerative joint disease)      Hypertension      Osteoporosis                 Code Status:      DNR / DNI        Brief Hospital Summary:        Reason for your hospital stay       Fall and sustained a left proximal fibular fracture. PT evaluated and   recommending TCU. Urinalysis concerning for infection but urine culture is   negative.                    Please refer to initial admission history and physical for further details.   Briefly, Lali Santos was  admitted on 6/27/2018 for concerns of fall and left proximal fibular fx.   Initial work up in the ED did not reveal evidence of significant acute metabolic process. Imaging of the left leg showed a proximal fibular fx. UA was abnormal and antibiotics were started. Pt was registered to the Observation Unit for further evaluation.   Pt was provided supportive cares. Orthopedics was consulted. Fracture managed non operatively, walking boot placed. PT consulted and recommended TCU. Labs were reviewed and significant results addressed. Urine culture was negative so abx were stopped.   On the day of discharge, a safe discharge plan was arranged. Medications were reviewed and adjustments made as necessary. Pt is instructed to follow up as below.           Significant Lab During Hospitalization:        Recent Labs  Lab 06/27/18 2028   WBC 9.6   HGB 13.7   HCT 43.3   MCV 99          Recent Labs  Lab 06/27/18 2028      POTASSIUM 4.3   CHLORIDE 106   CO2 25   ANIONGAP 6   *   BUN 23   CR 0.93   GFRESTIMATED 55*   GFRESTBLACK 67   ANGELINA 8.4*       Recent Labs  Lab 06/27/18 1826   COLOR [place]   APPEARANCE Cloudy   URINEGLC Negative   URINEBILI Negative   URINEKETONE Negative   SG 1.016   UBLD Large*   URINEPH 5.0   PROTEIN 100*   NITRITE Negative   LEUKEST Moderate*   RBCU >182*   WBCU 169*                Significant Imaging During Hospitalization:      Recent Results (from the past 48 hour(s))   XR Knee Left 3 Views    Narrative    XR KNEE LT 3 VW 6/27/2018 3:58 PM    COMPARISON: None.    HISTORY: Trauma.      Impression    IMPRESSION: Osteopenia about the left knee. There is a minimally  displaced proximal fibular fracture. No other fractures are suspected  in the left knee. Severe osteoarthritis with complete loss of joint  space in the lateral compartment as well as a small knee effusion and  chondrocalcinosis in the medial compartment.    YAMILET KEARNS MD   XR Tibia & Fibula Left 2 Views    Narrative     XR TIBIA & FIBULA LT 2 VW 6/28/2018 11:15 AM    HISTORY: Pain.    COMPARISON: None.      Impression    IMPRESSION: No evidence of acute fracture or malalignment.  Degenerative changes of the left knee.    ADAM GRANADOS MD   XR Ankle Left G/E 3 Views    Narrative    XR ANKLE LT G/E 3 VW 6/28/2018 11:16 AM    HISTORY: Pain.    COMPARISON: July 30, 2010.      Impression    IMPRESSION: Diffuse osteopenia. No definite radiographic evidence of  acute fracture or malalignment. Ankle mortise is intact. Calcaneal  bone spurs.    ADAM GRANADOS MD              Pending Results:        Unresulted Labs Ordered in the Past 30 Days of this Admission     No orders found from 4/28/2018 to 6/28/2018.              Consultations This Hospital Stay:      Consultation during this admission received from orthopedics         Discharge Instructions and Follow-Up:      Follow-up Appointments     Follow Up and recommended labs and tests       Follow up with Dr. Andersen of [facility] in 3 weeks. Please   call Dr. Andersen's care coordinator, Daphne, at [phone] to schedule this   appointment.                  Pt instructed to follow up with PCP in 7 days and with Consultant if indicated.   Follow-up Labs None        Discharge Disposition:      Discharged to home         Discharge Medications:        Current Discharge Medication List      START taking these medications    Details   acetaminophen (TYLENOL) 500 MG tablet Take 2 tablets (1,000 mg) by mouth 3 times daily    Associated Diagnoses: Closed fracture of proximal end of left fibula, unspecified fracture morphology, initial encounter      polyethylene glycol (MIRALAX/GLYCOLAX) Packet Take 17 g by mouth daily as needed for constipation  Qty: 7 packet    Associated Diagnoses: Constipation, unspecified constipation type      traMADol (ULTRAM) 50 MG tablet Take 0.5 tablets (25 mg) by mouth every 6 hours as needed for moderate pain  Qty: 10 tablet, Refills: 0    Associated Diagnoses:  "Closed fracture of proximal end of left fibula, unspecified fracture morphology, initial encounter         CONTINUE these medications which have NOT CHANGED    Details   aspirin 81 MG chewable tablet Take 81 mg by mouth daily      cholecalciferol 2000 UNITS CAPS Take 1 capsule by mouth daily      metoprolol (LOPRESSOR) 25 MG tablet Take 12.5 mg by mouth every morning       multivitamin (OCUVITE) TABS Take 1 tablet by mouth daily      simvastatin (ZOCOR) 10 MG tablet Take 10 mg by mouth every evening                 Allergies:       No Known Allergies        Condition and Physical on Discharge:      Discharge condition: Stable   Vitals: Blood pressure 122/57, pulse 85, temperature 98.1  F (36.7  C), temperature source Oral, resp. rate 14, height 1.6 m (5' 3\"), weight 64.1 kg (141 lb 4.8 oz), SpO2 96 %.  141 lbs 4.8 oz      GENERAL:  Comfortable.  PSYCH: pleasant, oriented, No acute distress.  HEART: RRR.  LUNGS:  Normal Respiratory effort.    EXTREMITIES:  Left LE in boot  SKIN:  Dry to touch, No rash, wound or ulcerations.  NEUROLOGIC:  Grossly intact.     Lali Rousseau PA-C    "

## 2018-06-29 NOTE — PLAN OF CARE
Problem: Patient Care Overview  Goal: Plan of Care/Patient Progress Review  Outcome: No Change  PRIMARY DIAGNOSIS: ACUTE PAIN  OUTPATIENT/OBSERVATION GOALS TO BE MET BEFORE DISCHARGE:  1. Pain Status: Pain free.    2. Return to near baseline physical activity: No    3. Cleared for discharge by consultants (if involved): No    Discharge Planner Nurse   Safe discharge environment identified: No  Barriers to discharge: Yes       Entered by: Josie Bonilla 06/29/2018 12:07 PM     Please review provider order for any additional goals.   Nurse to notify provider when observation goals have been met and patient is ready for discharge.    VSS, Alert, disoriented to time. Pt WBAT with cam boot on per notes.  Pt denies pain at this time, per pt no pain without movement.  Tele-afib/flutter CVR ().  PT consulted, awaiting assessment for recommendations to determine discharge plan.  Pt on IV rocephin, culture showing no growth but UA looked positive for UTI.  Pt on scheduled tylenol.

## 2018-06-29 NOTE — PLAN OF CARE
"Problem: Patient Care Overview  Goal: Plan of Care/Patient Progress Review  Outcome: No Change  PRIMARY DIAGNOSIS: ACUTE PAIN/ L fibular fx.  OUTPATIENT/OBSERVATION GOALS TO BE MET BEFORE DISCHARGE:  1. Pain Status: Improved-controlled with oral pain medications.      2. Return to near baseline physical activity: No      3. Cleared for discharge by consultants (if involved): No, PTand social work consults      Discharge Planner Nurse   Safe discharge environment identified: No  Barriers to discharge: Yes      Please review provider order for any additional goals.   Nurse to notify provider when observation goals have been met and patient is ready for discharge.    /79 (BP Location: Right arm)  Pulse 85  Temp 97.6  F (36.4  C) (Oral)  Resp 16  Ht 1.6 m (5' 3\")  Wt 64.1 kg (141 lb 4.8 oz)  SpO2 93%  BMI 25.03 kg/m2       AOx3 (disoriented to time/date). Per tele tech, rate controlled A fib, rate 60's-80's.  Denies pain at rest, reports pain with any movement of LLE. Getting schduled Tylenol and PRN tramadol available.  UC negative- IV Rocephin ordered and administered for possible UTI-provider to f/u with UC in am.    Dressing to LLE skin tear C/D/I-was changed earlier today.  Voiding on bedpan. Repositioning frequently.  Plan is for pt to get up with PT tomorrow (once CAM boot delivered) then can be WBAT on LLE. Will continue to monitor and provide supportive cares.      "

## 2018-06-29 NOTE — PLAN OF CARE
Problem: Patient Care Overview  Goal: Plan of Care/Patient Progress Review  PT: PT darnellal completed. Pt status post L fibular fracture, conservative management, WBAT in CAM boot. Pt lives in [facility]. Pt has assist with showers and meals. Pt uses electric W/C and transfers independently, no AD, does not perform any ambulation. Pt does dress herself and performs own am/pm cares.   Discharge Planner PT   Patient plan for discharge: open to TCU  Current status: Pt not able to perform bed mob or transfer indep as per baseline. Pt needing mod A x 2 for supine to sit, mod A x 2 and use of Nadine steady for transfer into bedside W/C. Tolerating wt of L LE but reporting significant pain with mobility items. Also pain with fitting of CAM boot.   Barriers to return to prior living situation: needing A x 2 for transfers  Recommendations for discharge: TCU  Rationale for recommendations: Pt needing A x 2 for transfers and lives in independent living, pt not safe to return to prior living environment at this time. Pt would benefit from training of donning boot, improving independence of transfers with continued PT prior to return to home.         Entered by: Julieth Bacon 06/29/2018 12:20 PM         Physical Therapy Discharge Summary    Reason for therapy discharge:    Recommendations given    Progress towards therapy goal(s). See goals on Care Plan in [facility] electronic health record for goal details.  Goals not met.  Barriers to achieving goals:   limited tolerance for therapy.    Therapy recommendation(s):    Continued therapy is recommended.  Rationale/Recommendations:  see above.

## 2018-06-29 NOTE — PLAN OF CARE
Problem: Patient Care Overview  Goal: Plan of Care/Patient Progress Review  PRIMARY DIAGNOSIS: ACUTE PAIN  OUTPATIENT/OBSERVATION GOALS TO BE MET BEFORE DISCHARGE:  1. Pain Status: Improved-controlled with oral pain medications.    2. Return to near baseline physical activity: No    3. Cleared for discharge by consultants (if involved): No    Patients vitals are stable, pain is controlled with oral tylenol.  Patient is A-fib/flutter, heart rate in 80s per tele tech.  Patient is weight bearing as tolerated with CAM boot though not assessed by PT yet.  Dressing to the left shin is clean dry and intact.  Patient may need TCU before returning to home.  Social work is following for placement.    Discharge Planner Nurse   Safe discharge environment identified: No  Barriers to discharge: Yes       Entered by: Brandon Lane 06/29/2018 12:53 AM     Please review provider order for any additional goals.   Nurse to notify provider when observation goals have been met and patient is ready for discharge.

## 2018-06-29 NOTE — PROGRESS NOTES
S: Patient with daughter were seen today at [facility] with an order from Tree Conn CNP to fit the patient with a LT cam walker. Daughter states that fracture happened yesterday while in the shower. 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    O: Patient was in bed for the fitting appointment. Patient's LT lower limb was wrapped from the ankle up to the knee in surgical dressing. There was bruising at the area proximal to the LT knee. LT ankle could not be placed at 90 degrees due to pain. Patient was alert/oriented during the appointment and in distress due to pain when LT lower limb was moved in certain positions.    A: Patient was fit with a [employer] Air Walker boot size small (#[id number]). Uprights were contoured to match the shape of the LT lower limb and provide M-L stability at the LT ankle. A heel wedge was placed in the walking boot to reduce pressure. Fit appears to be good. Patient will be up later on with physical therapy to further asses fit. Device is comfortable for the patient to wear, alignment is appropriate, there are no significant signs of pressure, and device has been checked for defects per 's guidelines. Patient is satisfied with the fit and function of the walking boot.    Patient and daughter were given verbal/written instructions on donning/doffing, care instructions, warranty issues, fitting issues, and who to contact if there is an issue.    G: Walking boot will treat patient's current condition by restricting motion at the LT ankle to provide joint stability, decrease pain, and aid in ambulation.     P: Patient will follow the provider's wearing schedule and follow up with the [facility] as needed.    This note has been electronically signed by Amado CLEVELAND , ABC #[id number], License #[id number]

## 2018-06-29 NOTE — PROGRESS NOTES
Ortho note:    Patient seen today in the presence of her son.    S:Patient resting comfortably. Denies pain while lying in bed.    O:VSS, Afeb, Tender at prox fibula. Notable foot drop on this left foot. Good circulation with minimal swelling.    A:Proximal left fibula fracture:    P:Non-op management. Foot drop is chronic and has AFO at home that she does not wear often. Transfer to TCU today. F/U with Dr Andersen in 3 weeks for repeat xray. This was all discussed with patient and son. They are in agreement with this plan. May remove boot for comfort, may WBAT, may use AFO instead of boot if more comfortable.    Herlinda Mcclellan PA-C  [phone]

## 2018-07-02 ENCOUNTER — RECORDS - HEALTHEAST (OUTPATIENT)
Dept: LAB | Facility: CLINIC | Age: 83
End: 2018-07-02

## 2018-07-03 LAB
ANION GAP SERPL CALCULATED.3IONS-SCNC: 9 MMOL/L (ref 5–18)
BUN SERPL-MCNC: 20 MG/DL (ref 8–28)
CALCIUM SERPL-MCNC: 9.3 MG/DL (ref 8.5–10.5)
CHLORIDE BLD-SCNC: 107 MMOL/L (ref 98–107)
CO2 SERPL-SCNC: 24 MMOL/L (ref 22–31)
CREAT SERPL-MCNC: 0.75 MG/DL (ref 0.6–1.1)
GFR SERPL CREATININE-BSD FRML MDRD: >60 ML/MIN/1.73M2
GLUCOSE BLD-MCNC: 75 MG/DL (ref 70–125)
POTASSIUM BLD-SCNC: 4.3 MMOL/L (ref 3.5–5)
SODIUM SERPL-SCNC: 140 MMOL/L (ref 136–145)

## 2018-07-18 ENCOUNTER — RECORDS - HEALTHEAST (OUTPATIENT)
Dept: LAB | Facility: CLINIC | Age: 83
End: 2018-07-18

## 2018-07-18 LAB
ALBUMIN UR-MCNC: ABNORMAL MG/DL
APPEARANCE UR: ABNORMAL
BACTERIA #/AREA URNS HPF: ABNORMAL HPF
BILIRUB UR QL STRIP: NEGATIVE
COLOR UR AUTO: ABNORMAL
GLUCOSE UR STRIP-MCNC: NEGATIVE MG/DL
HGB UR QL STRIP: ABNORMAL
KETONES UR STRIP-MCNC: NEGATIVE MG/DL
LEUKOCYTE ESTERASE UR QL STRIP: ABNORMAL
NITRATE UR QL: NEGATIVE
PH UR STRIP: 6.5 [PH] (ref 4.5–8)
RBC #/AREA URNS AUTO: >100 HPF
SP GR UR STRIP: 1.02 (ref 1–1.03)
SQUAMOUS #/AREA URNS AUTO: ABNORMAL LPF
UROBILINOGEN UR STRIP-ACNC: ABNORMAL
WBC #/AREA URNS AUTO: ABNORMAL HPF

## 2018-07-19 LAB — BACTERIA SPEC CULT: NO GROWTH

## 2018-07-22 ENCOUNTER — RECORDS - HEALTHEAST (OUTPATIENT)
Dept: LAB | Facility: CLINIC | Age: 83
End: 2018-07-22

## 2018-07-23 LAB
ANION GAP SERPL CALCULATED.3IONS-SCNC: 7 MMOL/L (ref 5–18)
BUN SERPL-MCNC: 19 MG/DL (ref 8–28)
CALCIUM SERPL-MCNC: 8.9 MG/DL (ref 8.5–10.5)
CHLORIDE BLD-SCNC: 103 MMOL/L (ref 98–107)
CO2 SERPL-SCNC: 24 MMOL/L (ref 22–31)
CREAT SERPL-MCNC: 0.71 MG/DL (ref 0.6–1.1)
ERYTHROCYTE [DISTWIDTH] IN BLOOD BY AUTOMATED COUNT: 15.8 % (ref 11–14.5)
GFR SERPL CREATININE-BSD FRML MDRD: >60 ML/MIN/1.73M2
GLUCOSE BLD-MCNC: 78 MG/DL (ref 70–125)
HCT VFR BLD AUTO: 41.9 % (ref 35–47)
HGB BLD-MCNC: 13.5 G/DL (ref 12–16)
MCH RBC QN AUTO: 31.6 PG (ref 27–34)
MCHC RBC AUTO-ENTMCNC: 32.2 G/DL (ref 32–36)
MCV RBC AUTO: 98 FL (ref 80–100)
PLATELET # BLD AUTO: 213 THOU/UL (ref 140–440)
PMV BLD AUTO: 9.7 FL (ref 8.5–12.5)
POTASSIUM BLD-SCNC: 4.2 MMOL/L (ref 3.5–5)
RBC # BLD AUTO: 4.27 MILL/UL (ref 3.8–5.4)
SODIUM SERPL-SCNC: 134 MMOL/L (ref 136–145)
WBC: 5.6 THOU/UL (ref 4–11)

## 2020-06-08 NOTE — PLAN OF CARE
Problem: Patient Care Overview  Goal: Plan of Care/Patient Progress Review  Outcome: No Change  ROOM # 221    Living Situation (if not independent, order SW consult): [place] [facility] with [facility] services as needed   Facility name:  : [facility] Daughter, Owen Son     Activity level at baseline: Electric wheelchair.   Activity level on admit: Total Care.       Patient registered to observation; given Patient Bill of Rights; given the opportunity to ask questions about observation status and their plan of care.  Patient has been oriented to the observation room, bathroom and call light is in place.    Discussed discharge goals and expectations with patient/family.                SSKI Counseling:  I discussed with the patient the risks of SSKI including but not limited to thyroid abnormalities, metallic taste, GI upset, fever, headache, acne, arthralgias, paraesthesias, lymphadenopathy, easy bleeding, arrhythmias, and allergic reaction.